# Patient Record
Sex: FEMALE | Race: WHITE | Employment: OTHER | URBAN - NONMETROPOLITAN AREA
[De-identification: names, ages, dates, MRNs, and addresses within clinical notes are randomized per-mention and may not be internally consistent; named-entity substitution may affect disease eponyms.]

---

## 2017-07-17 PROBLEM — I77.811 AORTIC ECTASIA, ABDOMINAL (HCC): Status: ACTIVE | Noted: 2017-07-17

## 2017-07-17 PROBLEM — I65.29 CAROTID STENOSIS, ASYMPTOMATIC: Status: ACTIVE | Noted: 2017-07-17

## 2017-12-23 ENCOUNTER — HOSPITAL ENCOUNTER (EMERGENCY)
Dept: GENERAL RADIOLOGY | Age: 82
Discharge: HOME | End: 2017-12-23
Payer: MEDICARE

## 2017-12-23 ENCOUNTER — HOSPITAL ENCOUNTER (EMERGENCY)
Age: 82
Discharge: HOME | End: 2017-12-23
Payer: MEDICARE

## 2017-12-23 VITALS
BODY MASS INDEX: 22.09 KG/M2 | WEIGHT: 117 LBS | HEIGHT: 61 IN | TEMPERATURE: 98.2 F | RESPIRATION RATE: 16 BRPM | SYSTOLIC BLOOD PRESSURE: 174 MMHG | HEART RATE: 70 BPM | OXYGEN SATURATION: 98 % | DIASTOLIC BLOOD PRESSURE: 84 MMHG

## 2017-12-23 DIAGNOSIS — N30.01 ACUTE CYSTITIS WITH HEMATURIA: ICD-10-CM

## 2017-12-23 DIAGNOSIS — S32.000A LUMBAR COMPRESSION FRACTURE, CLOSED, INITIAL ENCOUNTER (HCC): Primary | ICD-10-CM

## 2017-12-23 LAB
BILIRUBIN URINE: NEGATIVE
BLOOD, URINE: NEGATIVE
CHARACTER, URINE: CLEAR
COLOR: ABNORMAL
GLUCOSE, URINE: NEGATIVE MG/DL
KETONES, URINE: ABNORMAL
LEUKOCYTES, UA: ABNORMAL
NITRATE, UA: NEGATIVE
PH UA: 6 (ref 5–9)
PROTEIN UA: >= 300 MG/DL
REFLEX TO URINE C & S: ABNORMAL
SPECIFIC GRAVITY UA: 1.02 (ref 1–1.03)
UROBILINOGEN, URINE: 0.2 EU/DL (ref 0–1)

## 2017-12-23 PROCEDURE — 87086 URINE CULTURE/COLONY COUNT: CPT

## 2017-12-23 PROCEDURE — 99215 OFFICE O/P EST HI 40 MIN: CPT

## 2017-12-23 PROCEDURE — 72100 X-RAY EXAM L-S SPINE 2/3 VWS: CPT

## 2017-12-23 PROCEDURE — 99203 OFFICE O/P NEW LOW 30 MIN: CPT | Performed by: NURSE PRACTITIONER

## 2017-12-23 PROCEDURE — 81003 URINALYSIS AUTO W/O SCOPE: CPT

## 2017-12-23 RX ORDER — ACETAMINOPHEN AND CODEINE PHOSPHATE 300; 30 MG/1; MG/1
1 TABLET ORAL EVERY 6 HOURS PRN
Qty: 16 TABLET | Refills: 0 | Status: SHIPPED | OUTPATIENT
Start: 2017-12-23 | End: 2017-12-27

## 2017-12-23 RX ORDER — SULFAMETHOXAZOLE AND TRIMETHOPRIM 400; 80 MG/1; MG/1
1 TABLET ORAL 2 TIMES DAILY
Qty: 20 TABLET | Refills: 0 | Status: SHIPPED | OUTPATIENT
Start: 2017-12-23 | End: 2018-01-02

## 2017-12-23 ASSESSMENT — PAIN SCALES - GENERAL: PAINLEVEL_OUTOF10: 5

## 2017-12-23 ASSESSMENT — PAIN DESCRIPTION - PAIN TYPE: TYPE: ACUTE PAIN

## 2017-12-23 ASSESSMENT — PAIN DESCRIPTION - ORIENTATION: ORIENTATION: RIGHT;LOWER

## 2017-12-23 ASSESSMENT — ENCOUNTER SYMPTOMS: BACK PAIN: 1

## 2017-12-23 ASSESSMENT — PAIN DESCRIPTION - LOCATION: LOCATION: BACK

## 2017-12-23 NOTE — ED PROVIDER NOTES
[facility]  Urgent Care Encounter      CHIEF COMPLAINT       Chief Complaint   Patient presents with    Flank Pain     right       Nurses Notes reviewed and I agree except as noted in the HPI. HISTORY OF PRESENT ILLNESS   Rena Madera is a 80 y.o. female who presents Leaned over in chair sudden onset right flank pain. Denies any UTI symptoms. Is kidney patient Dr. Rosy Springer. No fever, SOB. The history is provided by the patient. No  was used. Back Pain   Pain location: right flank. Quality:  Stabbing  Radiates to:  Does not radiate  Pain severity:  Severe  Pain is:  Same all the time  Onset quality:  Sudden  Duration:  23 hours  Timing:  Intermittent  Progression:  Worsening  Chronicity:  New  Context: twisting    Context comment:  Reaching   Associated symptoms: no dysuria and no headaches    Risk factors: hx of osteoporosis    Risk factors comment:  Chronic kidney disease        REVIEW OF SYSTEMS     Review of Systems   Constitutional: Positive for activity change. Negative for chills and fatigue. Genitourinary: Negative for dysuria and urgency. Musculoskeletal: Positive for back pain. Right flank and lower back   Skin: Negative for rash. Neurological: Positive for dizziness. Negative for headaches. Am dizziness       PAST MEDICAL HISTORY         Diagnosis Date    Arthritis     CAD (coronary artery disease)     Cancer ([facility] [address]     uterine    Diabetes mellitus ([facility] [address]     GERD (gastroesophageal reflux disease)     Hematuria     Hyperlipidemia     Hypertension     Kidney atrophy     left kidney    Other disorders of kidney and ureter     Thyroid disease     UTI (urinary tract infection)        SURGICAL HISTORY     Patient  has a past surgical history that includes Stomach surgery; Coronary artery bypass graft; Coronary artery bypass graft; Knee arthroscopy (Left, 1983); Bunionectomy (Bilateral, 1993); Colonoscopy; back surgery (2008); (36.8 °C)   SpO2: 98%   Weight: 117 lb (53.1 kg)   Height: 5' 1\" (1.549 m)       Medications - No data to display  PROCEDURES:  None  FINAL IMPRESSION      1. Lumbar compression fracture, closed, initial encounter ([facility] [address]    2. Acute cystitis with hematuria        DISPOSITION/PLAN   DISPOSITION    Educated patient on diagnosis of compression fracture lumbar spine and treatment. Patient recalls that she had had previous treatment for a compression fracture where they injected medicine in her back. She understands the procedure. She would like to go home for [place] on some pain medication. She understands that if she gets uncomfortable the pain gets worse she will go directly to the emergency department. She also will call her family doctor after [place] when the office reopens to make arrangements for kyphoplasty.  was present in the room and they're both agreeable to this plan of care. PATIENT REFERRED TO:  Fawn Garcia MD  [address] [facility] [address]  [phone]      call for an appointment to see your doctor    DISCHARGE MEDICATIONS:  New Prescriptions    ACETAMINOPHEN-CODEINE (TYLENOL #3) 300-30 MG PER TABLET    Take 1 tablet by mouth every 6 hours as needed for Pain .     SULFAMETHOXAZOLE-TRIMETHOPRIM (BACTRIM) 400-80 MG PER TABLET    Take 1 tablet by mouth 2 times daily for 10 days     Current Discharge Medication List          CARMEN Davila CNP  12/23/17 [address]19 [address], [facility]  12/23/17 2023

## 2017-12-25 LAB
ORGANISM: ABNORMAL
URINE CULTURE REFLEX: ABNORMAL

## 2017-12-26 ENCOUNTER — APPOINTMENT (OUTPATIENT)
Dept: GENERAL RADIOLOGY | Age: 82
End: 2017-12-26
Payer: MEDICARE

## 2017-12-26 ENCOUNTER — APPOINTMENT (OUTPATIENT)
Dept: CT IMAGING | Age: 82
End: 2017-12-26
Payer: MEDICARE

## 2017-12-26 ENCOUNTER — HOSPITAL ENCOUNTER (EMERGENCY)
Age: 82
Discharge: HOME | End: 2017-12-26
Attending: FAMILY MEDICINE
Payer: MEDICARE

## 2017-12-26 VITALS
SYSTOLIC BLOOD PRESSURE: 176 MMHG | DIASTOLIC BLOOD PRESSURE: 64 MMHG | OXYGEN SATURATION: 95 % | HEART RATE: 63 BPM | TEMPERATURE: 97.6 F | RESPIRATION RATE: 18 BRPM

## 2017-12-26 DIAGNOSIS — M54.50 RIGHT-SIDED LOW BACK PAIN WITHOUT SCIATICA, UNSPECIFIED CHRONICITY: Primary | ICD-10-CM

## 2017-12-26 LAB
ANION GAP SERPL CALCULATED.3IONS-SCNC: 12 MEQ/L (ref 8–16)
ANISOCYTOSIS: ABNORMAL
BASOPHILS # BLD: 0.8 %
BASOPHILS ABSOLUTE: 0.1 THOU/MM3 (ref 0–0.1)
BUN BLDV-MCNC: 34 MG/DL (ref 7–22)
CALCIUM SERPL-MCNC: 8.8 MG/DL (ref 8.5–10.5)
CHLORIDE BLD-SCNC: 100 MEQ/L (ref 98–111)
CO2: 27 MEQ/L (ref 23–33)
CREAT SERPL-MCNC: 1.8 MG/DL (ref 0.4–1.2)
EOSINOPHIL # BLD: 4.9 %
EOSINOPHILS ABSOLUTE: 0.5 THOU/MM3 (ref 0–0.4)
GFR SERPL CREATININE-BSD FRML MDRD: 27 ML/MIN/1.73M2
GLUCOSE BLD-MCNC: 121 MG/DL (ref 70–108)
HCT VFR BLD CALC: 35.9 % (ref 37–47)
HEMOGLOBIN: 12 GM/DL (ref 12–16)
LYMPHOCYTES # BLD: 14.9 %
LYMPHOCYTES ABSOLUTE: 1.4 THOU/MM3 (ref 1–4.8)
MCH RBC QN AUTO: 31.1 PG (ref 27–31)
MCHC RBC AUTO-ENTMCNC: 33.3 GM/DL (ref 33–37)
MCV RBC AUTO: 93.3 FL (ref 81–99)
MONOCYTES # BLD: 7.2 %
MONOCYTES ABSOLUTE: 0.7 THOU/MM3 (ref 0.4–1.3)
NUCLEATED RED BLOOD CELLS: 0 /100 WBC
OSMOLALITY CALCULATION: 286.4 MOSMOL/KG (ref 275–300)
PDW BLD-RTO: 15.5 % (ref 11.5–14.5)
PLATELET # BLD: 198 THOU/MM3 (ref 130–400)
PMV BLD AUTO: 9.4 MCM (ref 7.4–10.4)
POTASSIUM SERPL-SCNC: 4.3 MEQ/L (ref 3.5–5.2)
RBC # BLD: 3.85 MILL/MM3 (ref 4.2–5.4)
SEG NEUTROPHILS: 72.2 %
SEGMENTED NEUTROPHILS ABSOLUTE COUNT: 6.7 THOU/MM3 (ref 1.8–7.7)
SODIUM BLD-SCNC: 139 MEQ/L (ref 135–145)
WBC # BLD: 9.3 THOU/MM3 (ref 4.8–10.8)

## 2017-12-26 PROCEDURE — 99284 EMERGENCY DEPT VISIT MOD MDM: CPT

## 2017-12-26 PROCEDURE — 71020 XR CHEST STANDARD TWO VW: CPT

## 2017-12-26 PROCEDURE — 36415 COLL VENOUS BLD VENIPUNCTURE: CPT

## 2017-12-26 PROCEDURE — 80048 BASIC METABOLIC PNL TOTAL CA: CPT

## 2017-12-26 PROCEDURE — 85025 COMPLETE CBC W/AUTO DIFF WBC: CPT

## 2017-12-26 PROCEDURE — 76376 3D RENDER W/INTRP POSTPROCES: CPT

## 2017-12-26 PROCEDURE — 74176 CT ABD & PELVIS W/O CONTRAST: CPT

## 2017-12-26 PROCEDURE — 6370000000 HC RX 637 (ALT 250 FOR IP): Performed by: FAMILY MEDICINE

## 2017-12-26 RX ADMIN — Medication 30 ML: at 14:12

## 2017-12-26 ASSESSMENT — ENCOUNTER SYMPTOMS
ABDOMINAL PAIN: 0
NAUSEA: 0
SHORTNESS OF BREATH: 0
VOMITING: 0
BACK PAIN: 1

## 2017-12-26 NOTE — ED NOTES
Pt up to BR without difficulty. Updated on POC. Will monitor.       Wojciech Jimenez RN  12/26/17 2261

## 2017-12-26 NOTE — ED PROVIDER NOTES
[facility]  eMERGENCY dEPARTMENT eNCOUnter          Haylie Raymundo       Chief Complaint   Patient presents with    Flank Pain     right       Nurses Notes reviewed and I agree except as noted in the HPI. HISTORY OF PRESENT ILLNESS    Jan King is a 80 y.o. female who presents to the Emergency Department for the evaluation of right flank pain. The patient states her pain started 4 days ago when she got up from the kitchen table. She states the pain progressively got worse throughout the day. Patient went to Urgent Care on 12/23/17 and was diagnosed with a bladder infection and a lumbar varinder fracture. She has been taking the antibiotics for the bactrim and she denies any urinary symptoms. Patient took her tylenol with codeine 3 days ago and states it helped, but the second pill made her loopy. Patient had some pain yesterday, so she took 1/2 of a pill, which did not make her loopy and it did help. She states that her pain is not too bad today. Patient states that her pain is intermittent and is made worse with certain movement and standing. She states her pain is better when she is laying down at night. The patient has been using her cane, and she states she uses it constantly. She reports associated nausea. She denies abdominal pain, vomiting, fever, chills, diarrhea or shortness of breath. The patient states she only hs her right kidney and her left kidney didn't develop. Location/Symptom: right flank pain  Timing/Onset: 4 days ago  Context/Setting: diagnosed with a bladder infection and lumbar spine fracture on 12/23/17  Quality: sharp at times   Duration: intermittent  Modifying Factors: worse with certain movement and standing, better when she is laying down at night  Severity: 5/10     The HPI was provided by the patient. REVIEW OF SYSTEMS     Review of Systems   Constitutional: Negative for chills and fever. HENT: Negative for congestion.     Respiratory: Negative for shortness of breath. Cardiovascular: Negative for chest pain. Gastrointestinal: Negative for abdominal pain, nausea and vomiting. Genitourinary: Negative for dysuria and urgency. Started on antibiotic for UTI though had no symptoms   Musculoskeletal: Positive for back pain. Right low back pain    No pain over the midline   Skin: Negative for rash. Neurological: Negative for weakness and numbness. Hematological: Negative for adenopathy. Psychiatric/Behavioral: Negative for confusion. PAST MEDICAL HISTORY    has a past medical history of Arthritis; CAD (coronary artery disease); Cancer ([facility] [address] Diabetes mellitus ([facility] [address] GERD (gastroesophageal reflux disease); Hematuria; Hyperlipidemia; Hypertension; Kidney atrophy; Other disorders of kidney and ureter; Thyroid disease; and UTI (urinary tract infection). SURGICAL HISTORY      has a past surgical history that includes Stomach surgery; Coronary artery bypass graft; Coronary artery bypass graft; Knee arthroscopy (Left, 1983); Bunionectomy (Bilateral, 1993); Colonoscopy; back surgery (2008); Breast biopsy (Left, 1975); Knee arthroscopy (Right, 1991); Nose surgery (1996); Knee arthroscopy (Right, 2009); Coronary artery bypass graft (2006); and other surgical history (5/23/2013). CURRENT MEDICATIONS       Previous Medications    ACETAMINOPHEN-CODEINE (TYLENOL #3) 300-30 MG PER TABLET    Take 1 tablet by mouth every 6 hours as needed for Pain . ALLOPURINOL (ZYLOPRIM) 100 MG TABLET    Take 100 mg by mouth daily.       AMLODIPINE-BENAZEPRIL (LOTREL) 5-20 MG PER CAPSULE    Take 1 capsule by mouth daily     ATORVASTATIN (LIPITOR) 20 MG TABLET    Take 20 mg by mouth daily    CALCIUM CARBONATE (CALCIUM 600 PO)    Take 2 tablets by mouth daily    CHOLECALCIFEROL (VITAMIN D3) 2000 UNITS CAPS    Take 1 capsule by mouth daily    DENOSUMAB (PROLIA) 60 MG/ML SOLN SC INJECTION    Inject 60 mg into the skin once ONCE PER YEAR    DOCUSATE CALCIUM (STOOL Musculoskeletal: She exhibits tenderness. Tender at the right CVA    No tenderness directly along the spine    Arms and legs move well with no pain   Lymphadenopathy:     She has no cervical adenopathy. Neurological: She is alert and oriented to person, place, and time. She exhibits normal muscle tone. Skin: Skin is warm and dry. No rash noted. Psychiatric: She has a normal mood and affect. Her behavior is normal.   Nursing note and vitals reviewed. DIFFERENTIAL DIAGNOSIS:   Right CVA pain, this does not appear to be due to a compression fracture of her spine    A CAT scan to look for intra-abdominal process such as stone will also get CT reconstructions of the lumbar spine        DIAGNOSTIC RESULTS       RADIOLOGY: non-plain film images(s) such as CT, Ultrasound and MRI are read by the radiologist.    CT scan of the abdomen and pelvis did show gallstones. No other acute intra-abdominal process. CT reconstructions lumbar spine did show compression fractures of L1 and 2 which were chronic.     compression fractures of L3 and 4 which are new since 2014.   No definite evidence of acute process though age-indeterminate per radiology reading        LABS:   Labs Reviewed   CBC WITH AUTO DIFFERENTIAL - Abnormal; Notable for the following:        Result Value    RBC 3.85 (*)     Hematocrit 35.9 (*)     MCH 31.1 (*)     RDW 15.5 (*)     Eosinophils # 0.5 (*)     All other components within normal limits   BASIC METABOLIC PANEL - Abnormal; Notable for the following:     Glucose 121 (*)     BUN 34 (*)     CREATININE 1.8 (*)     All other components within normal limits   GLOMERULAR FILTRATION RATE, ESTIMATED - Abnormal; Notable for the following:     Est, Glom Filt Rate 27 (*)     All other components within normal limits   ANION GAP   OSMOLALITY       EMERGENCY DEPARTMENT COURSE:   Vitals:    Vitals:    12/26/17 1159 12/26/17 1311   BP: (!) 179/63 (!) 176/64   Pulse: 64 63   Resp: 18 18   Temp: 97.6 °F (36.4 °C)    TempSrc: Oral    SpO2: 98% 95%       11:52 AM: The patient was seen and evaluated. Nursing notes reviewed     CT scan does confirm compression fractures of L1 and 2 which were old    Fractures of L3 and 4 which are new since 2014 but age-indeterminate    Her pain is not midline so I suspect they are not acute    Her urine culture from urgent care grew negative    She can stop her Bactrim    We'll treat her symptomatically for pain    Discharge home        CRITICAL CARE:   none     CONSULTS:   none     PROCEDURES:  None     FINAL IMPRESSION      1. Right-sided low back pain without sciatica, unspecified chronicity          DISPOSITION/PLAN   Discharge home     PATIENT REFERRED TO:  Zacarias Del Valle MD  [address] [facility] [address]  [phone]    In 1 week        DISCHARGE MEDICATIONS:  New Prescriptions    No medications on file       (Please note that portions of this note were completed with a voice recognition program.  Efforts were made to edit the dictations but occasionally words are mis-transcribed.)    Scribe:  Lopez Haley 12/26/17 11:52 AM Scribing for and in the presence of Enma Padilla MD.    Signed by: Gaurang Marte, 12/26/17 3:15 PM    Provider:  I personally performed the services described in the documentation, reviewed and edited the documentation which was dictated to the scribe in my presence, and it accurately records my words and actions.     Enma Padilla MD 12/26/17 3:15 PM        Enma Padilla MD  12/26/17 7298

## 2018-01-01 ENCOUNTER — OFFICE VISIT (OUTPATIENT)
Dept: FAMILY MEDICINE CLINIC | Age: 83
End: 2018-01-01
Payer: MEDICARE

## 2018-01-01 VITALS
RESPIRATION RATE: 12 BRPM | BODY MASS INDEX: 21.1 KG/M2 | HEIGHT: 60 IN | SYSTOLIC BLOOD PRESSURE: 118 MMHG | HEART RATE: 76 BPM | WEIGHT: 107.5 LBS | DIASTOLIC BLOOD PRESSURE: 72 MMHG

## 2018-01-01 DIAGNOSIS — R42 ORTHOSTATIC LIGHTHEADEDNESS: ICD-10-CM

## 2018-01-01 DIAGNOSIS — M54.16 LUMBAR RADICULAR PAIN: Primary | ICD-10-CM

## 2018-01-01 DIAGNOSIS — R26.81 UNSTABLE GAIT: ICD-10-CM

## 2018-01-01 DIAGNOSIS — M79.10 MYALGIA, UNSPECIFIED SITE: ICD-10-CM

## 2018-01-01 DIAGNOSIS — I10 ESSENTIAL HYPERTENSION: ICD-10-CM

## 2018-01-01 DIAGNOSIS — E03.9 HYPOTHYROIDISM, UNSPECIFIED TYPE: ICD-10-CM

## 2018-01-01 DIAGNOSIS — R41.3 SHORT-TERM MEMORY LOSS: ICD-10-CM

## 2018-01-01 DIAGNOSIS — N18.9 CHRONIC KIDNEY DISEASE, UNSPECIFIED CKD STAGE: ICD-10-CM

## 2018-01-01 LAB — TSH SERPL DL<=0.05 MIU/L-ACNC: 0.08 UIU/ML

## 2018-01-01 PROCEDURE — 4040F PNEUMOC VAC/ADMIN/RCVD: CPT | Performed by: FAMILY MEDICINE

## 2018-01-01 PROCEDURE — 99214 OFFICE O/P EST MOD 30 MIN: CPT | Performed by: FAMILY MEDICINE

## 2018-01-01 PROCEDURE — G8427 DOCREV CUR MEDS BY ELIG CLIN: HCPCS | Performed by: FAMILY MEDICINE

## 2018-01-01 PROCEDURE — G8420 CALC BMI NORM PARAMETERS: HCPCS | Performed by: FAMILY MEDICINE

## 2018-01-01 PROCEDURE — 1101F PT FALLS ASSESS-DOCD LE1/YR: CPT | Performed by: FAMILY MEDICINE

## 2018-01-01 PROCEDURE — 1123F ACP DISCUSS/DSCN MKR DOCD: CPT | Performed by: FAMILY MEDICINE

## 2018-01-01 PROCEDURE — G8484 FLU IMMUNIZE NO ADMIN: HCPCS | Performed by: FAMILY MEDICINE

## 2018-01-01 PROCEDURE — G8599 NO ASA/ANTIPLAT THER USE RNG: HCPCS | Performed by: FAMILY MEDICINE

## 2018-01-01 PROCEDURE — 1090F PRES/ABSN URINE INCON ASSESS: CPT | Performed by: FAMILY MEDICINE

## 2018-01-01 PROCEDURE — 1036F TOBACCO NON-USER: CPT | Performed by: FAMILY MEDICINE

## 2018-01-01 RX ORDER — BENAZEPRIL HYDROCHLORIDE 20 MG/1
20 TABLET ORAL DAILY
Qty: 90 TABLET | Refills: 11 | Status: SHIPPED | OUTPATIENT
Start: 2018-01-01

## 2018-01-01 RX ORDER — LEVOTHYROXINE SODIUM 0.07 MG/1
75 TABLET ORAL DAILY
Qty: 90 TABLET | Refills: 11 | Status: SHIPPED | OUTPATIENT
Start: 2018-01-01 | End: 2019-01-01

## 2018-01-01 RX ORDER — LEVOTHYROXINE SODIUM 0.07 MG/1
75 TABLET ORAL DAILY
Qty: 30 TABLET | Refills: 11 | Status: SHIPPED | OUTPATIENT
Start: 2018-01-01 | End: 2018-01-01 | Stop reason: SDUPTHER

## 2018-01-01 RX ORDER — BENAZEPRIL HYDROCHLORIDE 20 MG/1
20 TABLET ORAL DAILY
Qty: 30 TABLET | Refills: 11 | Status: SHIPPED | OUTPATIENT
Start: 2018-01-01 | End: 2018-01-01 | Stop reason: SDUPTHER

## 2018-01-01 ASSESSMENT — ENCOUNTER SYMPTOMS
BACK PAIN: 1
RESPIRATORY NEGATIVE: 1
GASTROINTESTINAL NEGATIVE: 1

## 2018-02-07 ENCOUNTER — TELEPHONE (OUTPATIENT)
Dept: FAMILY MEDICINE CLINIC | Age: 83
End: 2018-02-07

## 2018-02-07 NOTE — TELEPHONE ENCOUNTER
2/7/18 Pt is asking if she can become established w/ Dr Brady Morris. Her  is an established pt Anh Darling).  Pls advise thanks/agustina

## 2018-02-20 ENCOUNTER — OFFICE VISIT (OUTPATIENT)
Dept: FAMILY MEDICINE CLINIC | Age: 83
End: 2018-02-20
Payer: MEDICARE

## 2018-02-20 VITALS
OXYGEN SATURATION: 91 % | HEART RATE: 60 BPM | HEIGHT: 60 IN | BODY MASS INDEX: 22.03 KG/M2 | RESPIRATION RATE: 16 BRPM | DIASTOLIC BLOOD PRESSURE: 74 MMHG | WEIGHT: 112.2 LBS | SYSTOLIC BLOOD PRESSURE: 136 MMHG

## 2018-02-20 DIAGNOSIS — K21.9 GASTROESOPHAGEAL REFLUX DISEASE, ESOPHAGITIS PRESENCE NOT SPECIFIED: ICD-10-CM

## 2018-02-20 DIAGNOSIS — E03.9 HYPOTHYROIDISM, UNSPECIFIED TYPE: ICD-10-CM

## 2018-02-20 DIAGNOSIS — M10.00 IDIOPATHIC GOUT, UNSPECIFIED CHRONICITY, UNSPECIFIED SITE: ICD-10-CM

## 2018-02-20 DIAGNOSIS — I77.811 AORTIC ECTASIA, ABDOMINAL (HCC): ICD-10-CM

## 2018-02-20 DIAGNOSIS — E11.9 DIET-CONTROLLED TYPE 2 DIABETES MELLITUS (HCC): ICD-10-CM

## 2018-02-20 DIAGNOSIS — I65.29 ASYMPTOMATIC CAROTID ARTERY STENOSIS, UNSPECIFIED LATERALITY: Primary | ICD-10-CM

## 2018-02-20 DIAGNOSIS — I10 ESSENTIAL HYPERTENSION: ICD-10-CM

## 2018-02-20 DIAGNOSIS — M81.8 OTHER OSTEOPOROSIS, UNSPECIFIED PATHOLOGICAL FRACTURE PRESENCE: ICD-10-CM

## 2018-02-20 DIAGNOSIS — N18.9 CHRONIC KIDNEY DISEASE, UNSPECIFIED CKD STAGE: ICD-10-CM

## 2018-02-20 DIAGNOSIS — E78.49 OTHER HYPERLIPIDEMIA: ICD-10-CM

## 2018-02-20 PROBLEM — M81.0 OSTEOPOROSIS: Status: ACTIVE | Noted: 2018-02-20

## 2018-02-20 PROCEDURE — G8427 DOCREV CUR MEDS BY ELIG CLIN: HCPCS | Performed by: FAMILY MEDICINE

## 2018-02-20 PROCEDURE — G8599 NO ASA/ANTIPLAT THER USE RNG: HCPCS | Performed by: FAMILY MEDICINE

## 2018-02-20 PROCEDURE — 1036F TOBACCO NON-USER: CPT | Performed by: FAMILY MEDICINE

## 2018-02-20 PROCEDURE — 1090F PRES/ABSN URINE INCON ASSESS: CPT | Performed by: FAMILY MEDICINE

## 2018-02-20 PROCEDURE — 99204 OFFICE O/P NEW MOD 45 MIN: CPT | Performed by: FAMILY MEDICINE

## 2018-02-20 PROCEDURE — G8420 CALC BMI NORM PARAMETERS: HCPCS | Performed by: FAMILY MEDICINE

## 2018-02-20 PROCEDURE — 4040F PNEUMOC VAC/ADMIN/RCVD: CPT | Performed by: FAMILY MEDICINE

## 2018-02-20 PROCEDURE — 1123F ACP DISCUSS/DSCN MKR DOCD: CPT | Performed by: FAMILY MEDICINE

## 2018-02-20 PROCEDURE — G8484 FLU IMMUNIZE NO ADMIN: HCPCS | Performed by: FAMILY MEDICINE

## 2018-02-20 RX ORDER — AMLODIPINE BESYLATE AND BENAZEPRIL HYDROCHLORIDE 5; 20 MG/1; MG/1
1 CAPSULE ORAL DAILY
Qty: 30 CAPSULE | Refills: 11 | Status: SHIPPED | OUTPATIENT
Start: 2018-02-20 | End: 2018-04-14 | Stop reason: SDUPTHER

## 2018-02-20 ASSESSMENT — ENCOUNTER SYMPTOMS
RESPIRATORY NEGATIVE: 1
GASTROINTESTINAL NEGATIVE: 1

## 2018-02-20 ASSESSMENT — PATIENT HEALTH QUESTIONNAIRE - PHQ9
1. LITTLE INTEREST OR PLEASURE IN DOING THINGS: 0
2. FEELING DOWN, DEPRESSED OR HOPELESS: 0
SUM OF ALL RESPONSES TO PHQ9 QUESTIONS 1 & 2: 0
SUM OF ALL RESPONSES TO PHQ QUESTIONS 1-9: 0

## 2018-02-20 NOTE — PROGRESS NOTES
Patient informed and scheduled at [facility] on 2/22/18 arrival at 1:15 pm for 1:30 pm dexa scan. Prep of no calcium or multi vitamins 24 hours prior to testing, patient voiced understanding.
(CALCIUM 600 PO) Take 2 tablets by mouth daily   Yes Historical Provider, MD   GLUCOSAMINE-CHONDROITIN PO Take 1 tablet by mouth 2 times daily   Yes Historical Provider, MD   Ferrous Sulfate (IRON) 325 (65 Fe) MG TABS Take 2 tablets by mouth daily   Yes Historical Provider, MD   Cholecalciferol (VITAMIN D3) 2000 units CAPS Take 1 capsule by mouth daily   Yes Historical Provider, MD   Docusate Calcium (STOOL SOFTENER PO) Take 2 tablets by mouth nightly   Yes Historical Provider, MD   denosumab (PROLIA) 60 MG/ML SOLN SC injection Inject 60 mg into the skin once ONCE PER YEAR   Yes Historical Provider, MD   amLODIPine-benazepril (LOTREL) 5-20 MG per capsule Take 1 capsule by mouth daily    Yes Historical Provider, MD   levothyroxine (SYNTHROID) 125 MCG tablet Take 125 mcg by mouth daily. Yes Historical Provider, MD   Multiple Vitamin (DAILY VITAMIN FORMULA PO) Take 1 tablet by mouth daily. Yes Historical Provider, MD   allopurinol (ZYLOPRIM) 100 MG tablet Take 100 mg by mouth daily. Yes Historical Provider, MD   metoprolol (TOPROL-XL) 50 MG XL tablet Take 50 mg by mouth 2 times daily.      Yes Historical Provider, MD       Lab Results   Component Value Date    LABA1C 6.3 12/22/2014     No results found for: EAG    No components found for: CHLPL  No results found for: TRIG  No results found for: HDL  No results found for: LDLCALC  No results found for: LABVLDL      Chemistry        Component Value Date/Time     12/26/2017 1221    K 4.3 12/26/2017 1221     12/26/2017 1221    CO2 27 12/26/2017 1221    BUN 34 (H) 12/26/2017 1221    CREATININE 1.8 (H) 12/26/2017 1221        Component Value Date/Time    CALCIUM 8.8 12/26/2017 1221    ALKPHOS 69 11/27/2012 0917    AST 20 11/27/2012 0917    ALT 11 11/27/2012 0917    BILITOT 0.4 11/27/2012 0917            Lab Results   Component Value Date    TSH 4.640 (H) 12/22/2014       Lab Results   Component Value Date    WBC 9.3 12/26/2017    HGB 12.0 12/26/2017    HCT

## 2018-02-22 ENCOUNTER — HOSPITAL ENCOUNTER (OUTPATIENT)
Dept: WOMENS IMAGING | Age: 83
Discharge: HOME | End: 2018-02-22
Payer: MEDICARE

## 2018-02-22 DIAGNOSIS — M81.8 OTHER OSTEOPOROSIS, UNSPECIFIED PATHOLOGICAL FRACTURE PRESENCE: ICD-10-CM

## 2018-02-22 PROCEDURE — 77080 DXA BONE DENSITY AXIAL: CPT

## 2018-03-02 LAB
AVERAGE GLUCOSE: NORMAL
CREATININE URINE: 95.2 MG/DL
HBA1C MFR BLD: 5.7 %
MICROALBUMIN/CREAT 24H UR: 1102.6 MG/G{CREAT}
TSH SERPL DL<=0.05 MIU/L-ACNC: 24.3 UIU/ML

## 2018-03-07 ENCOUNTER — OFFICE VISIT (OUTPATIENT)
Dept: FAMILY MEDICINE CLINIC | Age: 83
End: 2018-03-07
Payer: MEDICARE

## 2018-03-07 VITALS
RESPIRATION RATE: 12 BRPM | DIASTOLIC BLOOD PRESSURE: 76 MMHG | HEIGHT: 60 IN | SYSTOLIC BLOOD PRESSURE: 130 MMHG | HEART RATE: 68 BPM | WEIGHT: 110 LBS | BODY MASS INDEX: 21.6 KG/M2

## 2018-03-07 DIAGNOSIS — E03.9 HYPOTHYROIDISM, UNSPECIFIED TYPE: ICD-10-CM

## 2018-03-07 DIAGNOSIS — E03.9 HYPOTHYROIDISM, UNSPECIFIED TYPE: Primary | ICD-10-CM

## 2018-03-07 PROCEDURE — 4040F PNEUMOC VAC/ADMIN/RCVD: CPT | Performed by: FAMILY MEDICINE

## 2018-03-07 PROCEDURE — 1090F PRES/ABSN URINE INCON ASSESS: CPT | Performed by: FAMILY MEDICINE

## 2018-03-07 PROCEDURE — G8599 NO ASA/ANTIPLAT THER USE RNG: HCPCS | Performed by: FAMILY MEDICINE

## 2018-03-07 PROCEDURE — G8427 DOCREV CUR MEDS BY ELIG CLIN: HCPCS | Performed by: FAMILY MEDICINE

## 2018-03-07 PROCEDURE — 99213 OFFICE O/P EST LOW 20 MIN: CPT | Performed by: FAMILY MEDICINE

## 2018-03-07 PROCEDURE — G8420 CALC BMI NORM PARAMETERS: HCPCS | Performed by: FAMILY MEDICINE

## 2018-03-07 PROCEDURE — G8484 FLU IMMUNIZE NO ADMIN: HCPCS | Performed by: FAMILY MEDICINE

## 2018-03-07 PROCEDURE — 1123F ACP DISCUSS/DSCN MKR DOCD: CPT | Performed by: FAMILY MEDICINE

## 2018-03-07 PROCEDURE — 1036F TOBACCO NON-USER: CPT | Performed by: FAMILY MEDICINE

## 2018-03-07 RX ORDER — LEVOTHYROXINE SODIUM 0.12 MG/1
125 TABLET ORAL DAILY
Qty: 30 TABLET | Refills: 2 | Status: SHIPPED | OUTPATIENT
Start: 2018-03-07 | End: 2018-03-07 | Stop reason: SDUPTHER

## 2018-03-07 RX ORDER — LEVOTHYROXINE SODIUM 0.12 MG/1
125 TABLET ORAL DAILY
Qty: 90 TABLET | Refills: 0 | Status: SHIPPED | OUTPATIENT
Start: 2018-03-07 | End: 2018-06-19 | Stop reason: DRUGHIGH

## 2018-03-07 ASSESSMENT — ENCOUNTER SYMPTOMS
GASTROINTESTINAL NEGATIVE: 1
RESPIRATORY NEGATIVE: 1

## 2018-03-07 NOTE — PROGRESS NOTES
Visit Information    Have you changed or started any medications since your last visit including any over-the-counter medicines, vitamins, or herbal medicines? no   Are you having any side effects from any of your medications? -  no  Have you stopped taking any of your medications? Is so, why? -  no    Have you seen any other physician or provider since your last visit? No  Have you had any other diagnostic tests since your last visit? Yes - Records Obtained  Have you been seen in the emergency room and/or had an admission to a hospital since we last saw you? No  Have you had your routine dental cleaning in the past 6 months? no    Have you activated your [employer] account? If not, what are your barriers?  No: declines     Patient Care Team:  Nivia Lema DO as PCP - General (Family Medicine)  Rola Ogden MD as Physician (Cardiology)  Edu Beebe MD as Physician (Nephrology)    Medical History Review  Past Medical, Family, and Social History reviewed and does contribute to the patient presenting condition    Health Maintenance   Topic Date Due    DTaP/Tdap/Td vaccine (1 - Tdap) 06/27/1948    Shingles Vaccine (1 of 2 - 2 Dose Series) 06/27/1979    Pneumococcal low/med risk (1 of 2 - PCV13) 06/27/1994    Flu vaccine (1) 09/01/2017    Potassium monitoring  12/26/2018    Creatinine monitoring  12/26/2018
GLUCOSAMINE-CHONDROITIN PO Take 1 tablet by mouth 2 times daily   Yes Historical Provider, MD   Ferrous Sulfate (IRON) 325 (65 Fe) MG TABS Take 2 tablets by mouth daily   Yes Historical Provider, MD   Cholecalciferol (VITAMIN D3) 2000 units CAPS Take 1 capsule by mouth daily   Yes Historical Provider, MD   Docusate Calcium (STOOL SOFTENER PO) Take 2 tablets by mouth nightly   Yes Historical Provider, MD   denosumab (PROLIA) 60 MG/ML SOLN SC injection Inject 60 mg into the skin once ONCE PER YEAR   Yes Historical Provider, MD   Multiple Vitamin (DAILY VITAMIN FORMULA PO) Take 1 tablet by mouth daily. Yes Historical Provider, MD   allopurinol (ZYLOPRIM) 100 MG tablet Take 100 mg by mouth daily. Yes Historical Provider, MD   metoprolol (TOPROL-XL) 50 MG XL tablet Take 50 mg by mouth 2 times daily. Yes Historical Provider, MD   levothyroxine (SYNTHROID) 125 MCG tablet TAKE 1 TABLET BY MOUTH DAILY 3/7/18   Rafael Ray DO         Review of Systems   Constitutional: Negative. HENT: Negative. Respiratory: Negative. Cardiovascular: Negative. Gastrointestinal: Negative. Musculoskeletal: Negative. All other systems reviewed and are negative. Objective:   Physical Exam   Constitutional: She is oriented to person, place, and time. She appears well-developed and well-nourished. HENT:   Head: Normocephalic and atraumatic. Right Ear: Tympanic membrane normal.   Left Ear: Tympanic membrane normal.   Mouth/Throat: Oropharynx is clear and moist and mucous membranes are normal.   Cardiovascular: Normal rate, regular rhythm and normal heart sounds. No murmur heard. Pulmonary/Chest: Effort normal and breath sounds normal.   Abdominal: Soft. Bowel sounds are normal.   Musculoskeletal: She exhibits no edema. Neurological: She is alert and oriented to person, place, and time. Skin: Skin is warm and dry. Psychiatric: She has a normal mood and affect.  Her behavior is normal.   Nursing

## 2018-03-29 ENCOUNTER — TELEPHONE (OUTPATIENT)
Dept: FAMILY MEDICINE CLINIC | Age: 83
End: 2018-03-29

## 2018-03-29 DIAGNOSIS — M81.8 OTHER OSTEOPOROSIS, UNSPECIFIED PATHOLOGICAL FRACTURE PRESENCE: Primary | ICD-10-CM

## 2018-03-30 ENCOUNTER — TELEPHONE (OUTPATIENT)
Dept: FAMILY MEDICINE CLINIC | Age: 83
End: 2018-03-30

## 2018-04-16 RX ORDER — AMLODIPINE BESYLATE AND BENAZEPRIL HYDROCHLORIDE 5; 20 MG/1; MG/1
1 CAPSULE ORAL DAILY
Qty: 90 CAPSULE | Refills: 11 | Status: SHIPPED | OUTPATIENT
Start: 2018-04-16 | End: 2018-01-01

## 2018-05-10 DIAGNOSIS — E03.9 HYPOTHYROIDISM, UNSPECIFIED TYPE: ICD-10-CM

## 2018-05-10 RX ORDER — LEVOTHYROXINE SODIUM 0.12 MG/1
125 TABLET ORAL DAILY
Qty: 90 TABLET | Refills: 2 | Status: SHIPPED | OUTPATIENT
Start: 2018-05-10 | End: 2018-06-06

## 2018-05-31 LAB — TSH SERPL DL<=0.05 MIU/L-ACNC: 0.05 UIU/ML

## 2018-06-04 DIAGNOSIS — E03.9 HYPOTHYROIDISM, UNSPECIFIED TYPE: Primary | ICD-10-CM

## 2018-06-06 RX ORDER — LEVOTHYROXINE SODIUM 112 UG/1
112 TABLET ORAL DAILY
Qty: 90 TABLET | Refills: 0 | Status: SHIPPED | OUTPATIENT
Start: 2018-06-06 | End: 2018-09-11

## 2018-06-19 ENCOUNTER — OFFICE VISIT (OUTPATIENT)
Dept: FAMILY MEDICINE CLINIC | Age: 83
End: 2018-06-19
Payer: MEDICARE

## 2018-06-19 ENCOUNTER — TELEPHONE (OUTPATIENT)
Dept: FAMILY MEDICINE CLINIC | Age: 83
End: 2018-06-19

## 2018-06-19 VITALS
WEIGHT: 106.6 LBS | BODY MASS INDEX: 20.93 KG/M2 | SYSTOLIC BLOOD PRESSURE: 102 MMHG | RESPIRATION RATE: 12 BRPM | HEIGHT: 60 IN | HEART RATE: 68 BPM | DIASTOLIC BLOOD PRESSURE: 64 MMHG

## 2018-06-19 DIAGNOSIS — E03.9 HYPOTHYROIDISM, UNSPECIFIED TYPE: Primary | ICD-10-CM

## 2018-06-19 DIAGNOSIS — K21.9 GASTROESOPHAGEAL REFLUX DISEASE, ESOPHAGITIS PRESENCE NOT SPECIFIED: ICD-10-CM

## 2018-06-19 DIAGNOSIS — N18.9 CHRONIC KIDNEY DISEASE, UNSPECIFIED CKD STAGE: ICD-10-CM

## 2018-06-19 DIAGNOSIS — M81.8 OTHER OSTEOPOROSIS WITHOUT CURRENT PATHOLOGICAL FRACTURE: ICD-10-CM

## 2018-06-19 DIAGNOSIS — E78.49 OTHER HYPERLIPIDEMIA: ICD-10-CM

## 2018-06-19 DIAGNOSIS — I77.811 AORTIC ECTASIA, ABDOMINAL (HCC): ICD-10-CM

## 2018-06-19 DIAGNOSIS — I65.29 ASYMPTOMATIC CAROTID ARTERY STENOSIS, UNSPECIFIED LATERALITY: ICD-10-CM

## 2018-06-19 PROCEDURE — 99214 OFFICE O/P EST MOD 30 MIN: CPT | Performed by: FAMILY MEDICINE

## 2018-06-19 PROCEDURE — 1036F TOBACCO NON-USER: CPT | Performed by: FAMILY MEDICINE

## 2018-06-19 PROCEDURE — 1123F ACP DISCUSS/DSCN MKR DOCD: CPT | Performed by: FAMILY MEDICINE

## 2018-06-19 PROCEDURE — 1090F PRES/ABSN URINE INCON ASSESS: CPT | Performed by: FAMILY MEDICINE

## 2018-06-19 PROCEDURE — G8427 DOCREV CUR MEDS BY ELIG CLIN: HCPCS | Performed by: FAMILY MEDICINE

## 2018-06-19 PROCEDURE — 4040F PNEUMOC VAC/ADMIN/RCVD: CPT | Performed by: FAMILY MEDICINE

## 2018-06-19 PROCEDURE — G8420 CALC BMI NORM PARAMETERS: HCPCS | Performed by: FAMILY MEDICINE

## 2018-06-19 PROCEDURE — G8599 NO ASA/ANTIPLAT THER USE RNG: HCPCS | Performed by: FAMILY MEDICINE

## 2018-06-19 RX ORDER — ALENDRONATE SODIUM 70 MG/1
70 TABLET ORAL
Qty: 4 TABLET | Refills: 11 | Status: SHIPPED | OUTPATIENT
Start: 2018-06-19 | End: 2018-06-19 | Stop reason: SDUPTHER

## 2018-06-19 ASSESSMENT — ENCOUNTER SYMPTOMS
GASTROINTESTINAL NEGATIVE: 1
RESPIRATORY NEGATIVE: 1

## 2018-07-16 ENCOUNTER — HOSPITAL ENCOUNTER (EMERGENCY)
Age: 83
Discharge: HOME | End: 2018-07-16
Payer: MEDICARE

## 2018-07-16 ENCOUNTER — HOSPITAL ENCOUNTER (EMERGENCY)
Dept: GENERAL RADIOLOGY | Age: 83
Discharge: HOME | End: 2018-07-16
Payer: MEDICARE

## 2018-07-16 VITALS
WEIGHT: 111 LBS | BODY MASS INDEX: 21.79 KG/M2 | RESPIRATION RATE: 16 BRPM | SYSTOLIC BLOOD PRESSURE: 166 MMHG | HEIGHT: 60 IN | HEART RATE: 67 BPM | OXYGEN SATURATION: 97 % | DIASTOLIC BLOOD PRESSURE: 69 MMHG | TEMPERATURE: 97.6 F

## 2018-07-16 DIAGNOSIS — S93.601A SPRAIN OF RIGHT FOOT, INITIAL ENCOUNTER: Primary | ICD-10-CM

## 2018-07-16 PROCEDURE — 99213 OFFICE O/P EST LOW 20 MIN: CPT

## 2018-07-16 PROCEDURE — 99213 OFFICE O/P EST LOW 20 MIN: CPT | Performed by: NURSE PRACTITIONER

## 2018-07-16 PROCEDURE — 73630 X-RAY EXAM OF FOOT: CPT

## 2018-07-16 ASSESSMENT — PAIN DESCRIPTION - FREQUENCY: FREQUENCY: CONTINUOUS

## 2018-07-16 ASSESSMENT — PAIN DESCRIPTION - LOCATION: LOCATION: FOOT;TOE (COMMENT WHICH ONE)

## 2018-07-16 ASSESSMENT — PAIN DESCRIPTION - ORIENTATION: ORIENTATION: RIGHT

## 2018-07-16 ASSESSMENT — PAIN SCALES - GENERAL: PAINLEVEL_OUTOF10: 8

## 2018-07-16 ASSESSMENT — PAIN DESCRIPTION - PAIN TYPE: TYPE: ACUTE PAIN

## 2018-07-16 ASSESSMENT — PAIN DESCRIPTION - DESCRIPTORS: DESCRIPTORS: DISCOMFORT;RADIATING

## 2018-08-01 ENCOUNTER — HOSPITAL ENCOUNTER (OUTPATIENT)
Dept: INTERVENTIONAL RADIOLOGY/VASCULAR | Age: 83
Discharge: HOME | End: 2018-08-01
Payer: MEDICARE

## 2018-08-01 DIAGNOSIS — R09.89 BRUIT: ICD-10-CM

## 2018-08-01 DIAGNOSIS — R42 DIZZINESS: ICD-10-CM

## 2018-08-01 DIAGNOSIS — R09.89 OTHER SPECIFIED SYMPTOMS AND SIGNS INVOLVING THE CIRCULATORY AND RESPIRATORY SYSTEMS: ICD-10-CM

## 2018-08-01 PROCEDURE — 93880 EXTRACRANIAL BILAT STUDY: CPT

## 2018-09-07 LAB — TSH SERPL DL<=0.05 MIU/L-ACNC: 0.09 UIU/ML

## 2018-09-10 DIAGNOSIS — E03.9 HYPOTHYROIDISM, UNSPECIFIED TYPE: Primary | ICD-10-CM

## 2018-09-11 DIAGNOSIS — E03.9 HYPOTHYROIDISM, UNSPECIFIED TYPE: ICD-10-CM

## 2018-09-11 RX ORDER — LEVOTHYROXINE SODIUM 0.1 MG/1
100 TABLET ORAL DAILY
Qty: 30 TABLET | Refills: 2 | Status: SHIPPED | OUTPATIENT
Start: 2018-09-11 | End: 2018-09-11 | Stop reason: SDUPTHER

## 2018-09-11 RX ORDER — LEVOTHYROXINE SODIUM 0.1 MG/1
100 TABLET ORAL DAILY
Qty: 90 TABLET | Refills: 2 | Status: SHIPPED | OUTPATIENT
Start: 2018-09-11 | End: 2018-01-01

## 2018-09-19 ENCOUNTER — OFFICE VISIT (OUTPATIENT)
Dept: FAMILY MEDICINE CLINIC | Age: 83
End: 2018-09-19
Payer: MEDICARE

## 2018-09-19 VITALS
HEIGHT: 60 IN | SYSTOLIC BLOOD PRESSURE: 132 MMHG | WEIGHT: 109.1 LBS | BODY MASS INDEX: 21.42 KG/M2 | HEART RATE: 56 BPM | RESPIRATION RATE: 16 BRPM | OXYGEN SATURATION: 93 % | DIASTOLIC BLOOD PRESSURE: 70 MMHG

## 2018-09-19 DIAGNOSIS — N18.9 CHRONIC KIDNEY DISEASE, UNSPECIFIED CKD STAGE: ICD-10-CM

## 2018-09-19 DIAGNOSIS — K21.9 GASTROESOPHAGEAL REFLUX DISEASE, ESOPHAGITIS PRESENCE NOT SPECIFIED: ICD-10-CM

## 2018-09-19 DIAGNOSIS — E03.9 HYPOTHYROIDISM, UNSPECIFIED TYPE: Primary | ICD-10-CM

## 2018-09-19 DIAGNOSIS — Z23 NEED FOR VACCINATION WITH 13-POLYVALENT PNEUMOCOCCAL CONJUGATE VACCINE: ICD-10-CM

## 2018-09-19 DIAGNOSIS — E78.49 OTHER HYPERLIPIDEMIA: ICD-10-CM

## 2018-09-19 DIAGNOSIS — I65.29 ASYMPTOMATIC CAROTID ARTERY STENOSIS, UNSPECIFIED LATERALITY: ICD-10-CM

## 2018-09-19 DIAGNOSIS — M81.8 OTHER OSTEOPOROSIS WITHOUT CURRENT PATHOLOGICAL FRACTURE: ICD-10-CM

## 2018-09-19 DIAGNOSIS — R53.1 GENERAL WEAKNESS: ICD-10-CM

## 2018-09-19 PROCEDURE — G8420 CALC BMI NORM PARAMETERS: HCPCS | Performed by: FAMILY MEDICINE

## 2018-09-19 PROCEDURE — G8599 NO ASA/ANTIPLAT THER USE RNG: HCPCS | Performed by: FAMILY MEDICINE

## 2018-09-19 PROCEDURE — 90670 PCV13 VACCINE IM: CPT | Performed by: FAMILY MEDICINE

## 2018-09-19 PROCEDURE — 4040F PNEUMOC VAC/ADMIN/RCVD: CPT | Performed by: FAMILY MEDICINE

## 2018-09-19 PROCEDURE — 1036F TOBACCO NON-USER: CPT | Performed by: FAMILY MEDICINE

## 2018-09-19 PROCEDURE — G8427 DOCREV CUR MEDS BY ELIG CLIN: HCPCS | Performed by: FAMILY MEDICINE

## 2018-09-19 PROCEDURE — G0009 ADMIN PNEUMOCOCCAL VACCINE: HCPCS | Performed by: FAMILY MEDICINE

## 2018-09-19 PROCEDURE — 1123F ACP DISCUSS/DSCN MKR DOCD: CPT | Performed by: FAMILY MEDICINE

## 2018-09-19 PROCEDURE — 1101F PT FALLS ASSESS-DOCD LE1/YR: CPT | Performed by: FAMILY MEDICINE

## 2018-09-19 PROCEDURE — 1090F PRES/ABSN URINE INCON ASSESS: CPT | Performed by: FAMILY MEDICINE

## 2018-09-19 PROCEDURE — 99214 OFFICE O/P EST MOD 30 MIN: CPT | Performed by: FAMILY MEDICINE

## 2018-09-19 ASSESSMENT — ENCOUNTER SYMPTOMS
RESPIRATORY NEGATIVE: 1
GASTROINTESTINAL NEGATIVE: 1

## 2018-09-19 NOTE — PROGRESS NOTES
Yes Genna Singleton DO   amLODIPine-benazepril (LOTREL) 5-20 MG per capsule TAKE 1 CAPSULE BY MOUTH DAILY 4/16/18  Yes Genna Singleton DO   Potassium 99 MG TABS Take 2 tablets by mouth daily   Yes Historical Provider, MD   atorvastatin (LIPITOR) 20 MG tablet Take 20 mg by mouth daily   Yes Historical Provider, MD   ranitidine (ZANTAC) 150 MG tablet Take 150 mg by mouth nightly   Yes Historical Provider, MD   Calcium Carbonate (CALCIUM 600 PO) Take 2 tablets by mouth daily   Yes Historical Provider, MD   GLUCOSAMINE-CHONDROITIN PO Take 1 tablet by mouth 2 times daily   Yes Historical Provider, MD   Ferrous Sulfate (IRON) 325 (65 Fe) MG TABS Take 2 tablets by mouth daily   Yes Historical Provider, MD   Cholecalciferol (VITAMIN D3) 2000 units CAPS Take 1 capsule by mouth daily   Yes Historical Provider, MD   Docusate Calcium (STOOL SOFTENER PO) Take 2 tablets by mouth nightly   Yes Historical Provider, MD   Multiple Vitamin (DAILY VITAMIN FORMULA PO) Take 1 tablet by mouth daily. Yes Historical Provider, MD   metoprolol (TOPROL-XL) 50 MG XL tablet Take 50 mg by mouth 2 times daily.      Yes Historical Provider, MD   denosumab (PROLIA) 60 MG/ML SOLN SC injection Inject 1 mL into the skin once for 1 dose Every 6 mos 3/29/18 3/29/18  Genna Singleton DO       Lab Results   Component Value Date    LABA1C 5.7 03/02/2018     No results found for: EAG    No components found for: CHLPL  No results found for: TRIG  No results found for: HDL  No results found for: LDLCALC  No results found for: LABVLDL      Chemistry        Component Value Date/Time     12/26/2017 1221    K 4.3 12/26/2017 1221     12/26/2017 1221    CO2 27 12/26/2017 1221    BUN 34 (H) 12/26/2017 1221    CREATININE 1.8 (H) 12/26/2017 1221        Component Value Date/Time    CALCIUM 8.8 12/26/2017 1221    ALKPHOS 69 11/27/2012 0917    AST 20 11/27/2012 0917    ALT 11 11/27/2012 0917    BILITOT 0.4 11/27/2012 0917            Lab Results Component Value Date    TSH 0.086 09/07/2018       Lab Results   Component Value Date    WBC 9.3 12/26/2017    HGB 12.0 12/26/2017    HCT 35.9 (L) 12/26/2017    MCV 93.3 12/26/2017     12/26/2017         Health Maintenance   Topic Date Due    DTaP/Tdap/Td vaccine (1 - Tdap) 06/27/1948    Shingles Vaccine (1 of 2 - 2 Dose Series) 06/27/1979    Pneumococcal low/med risk (1 of 2 - PCV13) 06/27/1994    Flu vaccine (1) 09/01/2018    Potassium monitoring  12/26/2018    Creatinine monitoring  12/26/2018    TSH testing  09/07/2019       Immunization History   Administered Date(s) Administered    Hepatitis B, unspecified formulation 04/17/2008, 05/20/2008, 10/20/2008         Review of Systems   Constitutional: Negative. HENT: Negative. Respiratory: Negative. Cardiovascular: Negative. Gastrointestinal: Negative. Musculoskeletal: Negative. All other systems reviewed and are negative. Objective:   Physical Exam   Constitutional: She is oriented to person, place, and time. She appears well-developed and well-nourished. HENT:   Head: Normocephalic and atraumatic. Right Ear: Tympanic membrane normal.   Left Ear: Tympanic membrane normal.   Mouth/Throat: Oropharynx is clear and moist and mucous membranes are normal.   Neck: Carotid bruit is present. Cardiovascular: Normal rate and regular rhythm. Murmur heard. Pulmonary/Chest: Effort normal and breath sounds normal.   Abdominal: Soft. Bowel sounds are normal.   Musculoskeletal: She exhibits no edema. Neurological: She is alert and oriented to person, place, and time. Skin: Skin is warm and dry. Psychiatric: She has a normal mood and affect. Her behavior is normal.   Nursing note and vitals reviewed. Assessment:       Diagnosis Orders   1. Hypothyroidism, unspecified type     2. Other hyperlipidemia     3. Other osteoporosis without current pathological fracture     4. Chronic kidney disease, unspecified CKD stage     5.

## 2018-12-17 NOTE — PROGRESS NOTES
Visit Information    Have you changed or started any medications since your last visit including any over-the-counter medicines, vitamins, or herbal medicines? no   Are you having any side effects from any of your medications? -  no  Have you stopped taking any of your medications? Is so, why? -  no    Have you seen any other physician or provider since your last visit? No  Have you had any other diagnostic tests since your last visit? Yes - Records Obtained  Have you been seen in the emergency room and/or had an admission to a hospital since we last saw you? No  Have you had your routine dental cleaning in the past 6 months? no    Have you activated your [employer] account? If not, what are your barriers?  No: pt choice     Patient Care Team:  Lina Mathias DO as PCP - General (Family Medicine)  Diandra Au MD as Physician (Cardiology)  Juan Trinidad MD as Physician (Nephrology)    Medical History Review  Past Medical, Family, and Social History reviewed and does contribute to the patient presenting condition    Health Maintenance   Topic Date Due    DTaP/Tdap/Td vaccine (1 - Tdap) 06[birth year]27/1948    Shingles Vaccine (1 of 2 - 2 Dose Series) 06/27/1979    Potassium monitoring  12/26/2018    Creatinine monitoring  12/26/2018    Pneumococcal low/med risk (2 of 2 - PPSV23) 09/19/2019    TSH testing  12/07/2019    Flu vaccine  Completed
type  levothyroxine (SYNTHROID) 75 MCG tablet    TSH with Reflex   5. Orthostatic lightheadedness     6. Chronic kidney disease, unspecified CKD stage     7. Essential hypertension  benazepril (LOTENSIN) 20 MG tablet   8.  Short-term memory loss             Plan:      -  Chronic medical problems stable  -  Continue current medications with the exception of amlodipine, will hold at this time to see if helps with her occasional lightheadedness  -  Hold Lipitor x 2 weeks to see if myalgias improve  -  Decrease Synthroid  -  Follow up with specialists as scheduled  -  Recheck TSH 3 mos  -  RTO 3 mos          Gaviota Parents, DO

## 2019-01-01 ENCOUNTER — TELEPHONE (OUTPATIENT)
Dept: FAMILY MEDICINE CLINIC | Age: 84
End: 2019-01-01

## 2019-01-01 ENCOUNTER — APPOINTMENT (OUTPATIENT)
Dept: CT IMAGING | Age: 84
DRG: 445 | End: 2019-01-01
Payer: MEDICARE

## 2019-01-01 ENCOUNTER — APPOINTMENT (OUTPATIENT)
Dept: GENERAL RADIOLOGY | Age: 84
DRG: 445 | End: 2019-01-01
Payer: MEDICARE

## 2019-01-01 ENCOUNTER — CARE COORDINATION (OUTPATIENT)
Dept: CARE COORDINATION | Age: 84
End: 2019-01-01

## 2019-01-01 ENCOUNTER — CARE COORDINATION (OUTPATIENT)
Dept: CASE MANAGEMENT | Age: 84
End: 2019-01-01

## 2019-01-01 ENCOUNTER — HOSPITAL ENCOUNTER (OUTPATIENT)
Dept: MRI IMAGING | Age: 84
Discharge: HOME | End: 2019-03-12
Payer: MEDICARE

## 2019-01-01 ENCOUNTER — OFFICE VISIT (OUTPATIENT)
Dept: FAMILY MEDICINE CLINIC | Age: 84
End: 2019-01-01
Payer: MEDICARE

## 2019-01-01 ENCOUNTER — OFFICE VISIT (OUTPATIENT)
Dept: SURGERY | Age: 84
End: 2019-01-01
Payer: MEDICARE

## 2019-01-01 ENCOUNTER — APPOINTMENT (OUTPATIENT)
Dept: NUCLEAR MEDICINE | Age: 84
DRG: 445 | End: 2019-01-01
Payer: MEDICARE

## 2019-01-01 ENCOUNTER — APPOINTMENT (OUTPATIENT)
Dept: NUCLEAR MEDICINE | Age: 84
DRG: 640 | End: 2019-01-01
Payer: MEDICARE

## 2019-01-01 ENCOUNTER — APPOINTMENT (OUTPATIENT)
Dept: ULTRASOUND IMAGING | Age: 84
DRG: 640 | End: 2019-01-01
Payer: MEDICARE

## 2019-01-01 ENCOUNTER — HOSPITAL ENCOUNTER (INPATIENT)
Age: 84
LOS: 2 days | DRG: 640 | End: 2019-10-25
Attending: EMERGENCY MEDICINE | Admitting: FAMILY MEDICINE
Payer: MEDICARE

## 2019-01-01 ENCOUNTER — HOSPITAL ENCOUNTER (INPATIENT)
Age: 84
LOS: 5 days | Discharge: HOME HEALTH | DRG: 445 | End: 2019-03-25
Attending: SURGERY | Admitting: SURGERY
Payer: MEDICARE

## 2019-01-01 VITALS
HEIGHT: 59 IN | DIASTOLIC BLOOD PRESSURE: 68 MMHG | OXYGEN SATURATION: 96 % | SYSTOLIC BLOOD PRESSURE: 118 MMHG | BODY MASS INDEX: 21.63 KG/M2 | HEART RATE: 65 BPM | WEIGHT: 107.3 LBS | TEMPERATURE: 97.4 F | RESPIRATION RATE: 15 BRPM

## 2019-01-01 VITALS
BODY MASS INDEX: 20.64 KG/M2 | HEART RATE: 72 BPM | WEIGHT: 103.9 LBS | OXYGEN SATURATION: 97 % | SYSTOLIC BLOOD PRESSURE: 142 MMHG | DIASTOLIC BLOOD PRESSURE: 82 MMHG | RESPIRATION RATE: 16 BRPM

## 2019-01-01 VITALS
HEIGHT: 60 IN | OXYGEN SATURATION: 91 % | RESPIRATION RATE: 18 BRPM | DIASTOLIC BLOOD PRESSURE: 65 MMHG | HEART RATE: 80 BPM | TEMPERATURE: 97.5 F | BODY MASS INDEX: 20.44 KG/M2 | SYSTOLIC BLOOD PRESSURE: 154 MMHG | WEIGHT: 104.1 LBS

## 2019-01-01 VITALS
HEIGHT: 62 IN | RESPIRATION RATE: 16 BRPM | HEART RATE: 64 BPM | DIASTOLIC BLOOD PRESSURE: 64 MMHG | SYSTOLIC BLOOD PRESSURE: 132 MMHG | WEIGHT: 101.6 LBS | BODY MASS INDEX: 18.69 KG/M2

## 2019-01-01 VITALS
HEIGHT: 60 IN | HEART RATE: 72 BPM | SYSTOLIC BLOOD PRESSURE: 138 MMHG | DIASTOLIC BLOOD PRESSURE: 72 MMHG | BODY MASS INDEX: 19.32 KG/M2 | OXYGEN SATURATION: 96 % | RESPIRATION RATE: 20 BRPM | WEIGHT: 98.4 LBS

## 2019-01-01 VITALS
SYSTOLIC BLOOD PRESSURE: 116 MMHG | OXYGEN SATURATION: 98 % | DIASTOLIC BLOOD PRESSURE: 50 MMHG | BODY MASS INDEX: 19.57 KG/M2 | RESPIRATION RATE: 20 BRPM | HEART RATE: 67 BPM | WEIGHT: 107 LBS | TEMPERATURE: 96 F

## 2019-01-01 VITALS
BODY MASS INDEX: 21.02 KG/M2 | DIASTOLIC BLOOD PRESSURE: 80 MMHG | SYSTOLIC BLOOD PRESSURE: 132 MMHG | RESPIRATION RATE: 16 BRPM | HEART RATE: 60 BPM | WEIGHT: 105.8 LBS

## 2019-01-01 VITALS
BODY MASS INDEX: 19.63 KG/M2 | SYSTOLIC BLOOD PRESSURE: 140 MMHG | DIASTOLIC BLOOD PRESSURE: 60 MMHG | HEART RATE: 64 BPM | RESPIRATION RATE: 20 BRPM | WEIGHT: 107.3 LBS

## 2019-01-01 VITALS
BODY MASS INDEX: 18.97 KG/M2 | TEMPERATURE: 97.7 F | HEIGHT: 62 IN | WEIGHT: 103.1 LBS | SYSTOLIC BLOOD PRESSURE: 168 MMHG | HEART RATE: 68 BPM | OXYGEN SATURATION: 95 % | DIASTOLIC BLOOD PRESSURE: 72 MMHG | RESPIRATION RATE: 16 BRPM

## 2019-01-01 DIAGNOSIS — E03.9 HYPOTHYROIDISM, UNSPECIFIED TYPE: ICD-10-CM

## 2019-01-01 DIAGNOSIS — K82.9 GALLBLADDER DISEASE: ICD-10-CM

## 2019-01-01 DIAGNOSIS — R11.2 NAUSEA AND VOMITING, INTRACTABILITY OF VOMITING NOT SPECIFIED, UNSPECIFIED VOMITING TYPE: ICD-10-CM

## 2019-01-01 DIAGNOSIS — F51.01 PRIMARY INSOMNIA: ICD-10-CM

## 2019-01-01 DIAGNOSIS — N18.9 CHRONIC KIDNEY DISEASE, UNSPECIFIED CKD STAGE: ICD-10-CM

## 2019-01-01 DIAGNOSIS — K81.0 ACUTE CHOLECYSTITIS: Primary | ICD-10-CM

## 2019-01-01 DIAGNOSIS — F03.90 DEMENTIA WITHOUT BEHAVIORAL DISTURBANCE, UNSPECIFIED DEMENTIA TYPE: ICD-10-CM

## 2019-01-01 DIAGNOSIS — R63.4 UNINTENDED WEIGHT LOSS: Primary | ICD-10-CM

## 2019-01-01 DIAGNOSIS — D50.9 IRON DEFICIENCY ANEMIA, UNSPECIFIED IRON DEFICIENCY ANEMIA TYPE: ICD-10-CM

## 2019-01-01 DIAGNOSIS — E83.52 HYPERCALCEMIA: Primary | ICD-10-CM

## 2019-01-01 DIAGNOSIS — R26.81 UNSTABLE GAIT: ICD-10-CM

## 2019-01-01 DIAGNOSIS — R53.1 GENERAL WEAKNESS: ICD-10-CM

## 2019-01-01 DIAGNOSIS — R79.89 ELEVATED BRAIN NATRIURETIC PEPTIDE (BNP) LEVEL: ICD-10-CM

## 2019-01-01 DIAGNOSIS — I10 ESSENTIAL HYPERTENSION: ICD-10-CM

## 2019-01-01 DIAGNOSIS — K81.0 CHOLECYSTITIS, ACUTE: Primary | ICD-10-CM

## 2019-01-01 DIAGNOSIS — I51.89 DIASTOLIC DYSFUNCTION: ICD-10-CM

## 2019-01-01 DIAGNOSIS — N18.30 CHRONIC KIDNEY DISEASE, STAGE III (MODERATE) (HCC): ICD-10-CM

## 2019-01-01 DIAGNOSIS — R41.3 MEMORY LOSS: ICD-10-CM

## 2019-01-01 DIAGNOSIS — I65.29 ASYMPTOMATIC CAROTID ARTERY STENOSIS, UNSPECIFIED LATERALITY: ICD-10-CM

## 2019-01-01 DIAGNOSIS — R63.4 UNINTENDED WEIGHT LOSS: ICD-10-CM

## 2019-01-01 DIAGNOSIS — L98.9 NON-HEALING SKIN LESION: Primary | ICD-10-CM

## 2019-01-01 DIAGNOSIS — F03.90 DEMENTIA WITHOUT BEHAVIORAL DISTURBANCE, UNSPECIFIED DEMENTIA TYPE: Primary | ICD-10-CM

## 2019-01-01 DIAGNOSIS — R53.1 GENERAL WEAKNESS: Primary | ICD-10-CM

## 2019-01-01 DIAGNOSIS — B02.9 HERPES ZOSTER WITHOUT COMPLICATION: ICD-10-CM

## 2019-01-01 DIAGNOSIS — E44.0 MODERATE MALNUTRITION (HCC): ICD-10-CM

## 2019-01-01 DIAGNOSIS — R41.3 MEMORY LOSS: Primary | ICD-10-CM

## 2019-01-01 DIAGNOSIS — K82.9 GALLBLADDER DISEASE: Primary | ICD-10-CM

## 2019-01-01 DIAGNOSIS — E11.9 DIET-CONTROLLED TYPE 2 DIABETES MELLITUS (HCC): ICD-10-CM

## 2019-01-01 DIAGNOSIS — D64.9 ANEMIA, UNSPECIFIED TYPE: ICD-10-CM

## 2019-01-01 LAB
ALBUMIN SERPL-MCNC: 3.2 G/DL (ref 3.5–5.1)
ALBUMIN SERPL-MCNC: 3.3 G/DL (ref 3.5–5.1)
ALP BLD-CCNC: 119 U/L (ref 38–126)
ALP BLD-CCNC: 60 U/L (ref 38–126)
ALT SERPL-CCNC: 23 U/L (ref 11–66)
ALT SERPL-CCNC: < 5 U/L (ref 11–66)
AMORPHOUS: ABNORMAL
ANION GAP SERPL CALCULATED.3IONS-SCNC: 11 MEQ/L (ref 8–16)
ANION GAP SERPL CALCULATED.3IONS-SCNC: 12 MEQ/L (ref 8–16)
ANION GAP SERPL CALCULATED.3IONS-SCNC: 13 MEQ/L (ref 8–16)
ANION GAP SERPL CALCULATED.3IONS-SCNC: 16 MEQ/L (ref 8–16)
ANION GAP SERPL CALCULATED.3IONS-SCNC: 18 MEQ/L (ref 8–16)
ANION GAP SERPL CALCULATED.3IONS-SCNC: 9 MEQ/L (ref 8–16)
ANISOCYTOSIS: PRESENT
AST SERPL-CCNC: 12 U/L (ref 5–40)
AST SERPL-CCNC: 18 U/L (ref 5–40)
BACTERIA: ABNORMAL /HPF
BACTERIA: ABNORMAL /HPF
BASOPHILS # BLD: 0.2 %
BASOPHILS # BLD: 0.4 %
BASOPHILS ABSOLUTE: 0 THOU/MM3 (ref 0–0.1)
BILIRUB SERPL-MCNC: 0.4 MG/DL (ref 0.3–1.2)
BILIRUB SERPL-MCNC: 0.6 MG/DL (ref 0.3–1.2)
BILIRUBIN URINE: NEGATIVE
BILIRUBIN URINE: NEGATIVE
BLOOD, URINE: ABNORMAL
BLOOD, URINE: NEGATIVE
BUN BLDV-MCNC: 38 MG/DL (ref 7–22)
BUN BLDV-MCNC: 39 MG/DL (ref 7–22)
BUN BLDV-MCNC: 41 MG/DL (ref 7–22)
BUN BLDV-MCNC: 42 MG/DL (ref 7–22)
BUN BLDV-MCNC: 46 MG/DL (ref 7–22)
BUN BLDV-MCNC: 60 MG/DL (ref 7–22)
BUN BLDV-MCNC: 64 MG/DL (ref 7–22)
BUN BLDV-MCNC: 67 MG/DL (ref 7–22)
BUN BLDV-MCNC: 68 MG/DL (ref 7–22)
BUN BLDV-MCNC: 73 MG/DL (ref 7–22)
CALCIUM IONIZED: 1.66 MMOL/L (ref 1.12–1.32)
CALCIUM SERPL-MCNC: 11.3 MG/DL (ref 8.5–10.5)
CALCIUM SERPL-MCNC: 11.4 MG/DL (ref 8.5–10.5)
CALCIUM SERPL-MCNC: 11.4 MG/DL (ref 8.5–10.5)
CALCIUM SERPL-MCNC: 11.8 MG/DL (ref 8.5–10.5)
CALCIUM SERPL-MCNC: 13.9 MG/DL (ref 8.5–10.5)
CALCIUM SERPL-MCNC: 9.2 MG/DL (ref 8.5–10.5)
CALCIUM SERPL-MCNC: 9.3 MG/DL (ref 8.5–10.5)
CALCIUM SERPL-MCNC: 9.5 MG/DL (ref 8.5–10.5)
CASTS 2: ABNORMAL /LPF
CASTS 2: ABNORMAL /LPF
CASTS UA: ABNORMAL /LPF
CASTS UA: ABNORMAL /LPF
CHARACTER, URINE: CLEAR
CHARACTER, URINE: CLEAR
CHLORIDE BLD-SCNC: 102 MEQ/L (ref 98–111)
CHLORIDE BLD-SCNC: 104 MEQ/L (ref 98–111)
CHLORIDE BLD-SCNC: 105 MEQ/L (ref 98–111)
CHLORIDE BLD-SCNC: 105 MEQ/L (ref 98–111)
CHLORIDE BLD-SCNC: 107 MEQ/L (ref 98–111)
CHLORIDE BLD-SCNC: 108 MEQ/L (ref 98–111)
CHLORIDE BLD-SCNC: 109 MEQ/L (ref 98–111)
CHLORIDE BLD-SCNC: 111 MEQ/L (ref 98–111)
CO2: 20 MEQ/L (ref 23–33)
CO2: 20 MEQ/L (ref 23–33)
CO2: 21 MEQ/L (ref 23–33)
CO2: 22 MEQ/L (ref 23–33)
CO2: 23 MEQ/L (ref 23–33)
CO2: 25 MEQ/L (ref 23–33)
CO2: 27 MEQ/L (ref 23–33)
CO2: 28 MEQ/L (ref 23–33)
COLOR: YELLOW
COLOR: YELLOW
CREAT SERPL-MCNC: 1.4 MG/DL (ref 0.4–1.2)
CREAT SERPL-MCNC: 1.5 MG/DL (ref 0.4–1.2)
CREAT SERPL-MCNC: 1.6 MG/DL (ref 0.4–1.2)
CREAT SERPL-MCNC: 2.4 MG/DL (ref 0.4–1.2)
CREAT SERPL-MCNC: 2.5 MG/DL (ref 0.4–1.2)
CREAT SERPL-MCNC: 2.6 MG/DL (ref 0.4–1.2)
CREAT SERPL-MCNC: 2.6 MG/DL (ref 0.4–1.2)
CREAT SERPL-MCNC: 2.8 MG/DL (ref 0.4–1.2)
CREATININE URINE: 88.4 MG/DL
CRYSTALS, UA: ABNORMAL
CRYSTALS, UA: ABNORMAL
EKG ATRIAL RATE: 59 BPM
EKG ATRIAL RATE: 61 BPM
EKG ATRIAL RATE: 86 BPM
EKG ATRIAL RATE: 90 BPM
EKG ATRIAL RATE: 91 BPM
EKG ATRIAL RATE: 92 BPM
EKG P AXIS: 63 DEGREES
EKG P AXIS: 75 DEGREES
EKG P AXIS: 80 DEGREES
EKG P AXIS: 85 DEGREES
EKG P AXIS: 93 DEGREES
EKG P-R INTERVAL: 118 MS
EKG P-R INTERVAL: 124 MS
EKG P-R INTERVAL: 128 MS
EKG P-R INTERVAL: 136 MS
EKG P-R INTERVAL: 136 MS
EKG Q-T INTERVAL: 348 MS
EKG Q-T INTERVAL: 376 MS
EKG Q-T INTERVAL: 382 MS
EKG Q-T INTERVAL: 394 MS
EKG Q-T INTERVAL: 492 MS
EKG Q-T INTERVAL: 496 MS
EKG QRS DURATION: 136 MS
EKG QRS DURATION: 140 MS
EKG QRS DURATION: 142 MS
EKG QRS DURATION: 144 MS
EKG QRS DURATION: 154 MS
EKG QRS DURATION: 154 MS
EKG QTC CALCULATION (BAZETT): 439 MS
EKG QTC CALCULATION (BAZETT): 457 MS
EKG QTC CALCULATION (BAZETT): 464 MS
EKG QTC CALCULATION (BAZETT): 484 MS
EKG QTC CALCULATION (BAZETT): 487 MS
EKG QTC CALCULATION (BAZETT): 499 MS
EKG R AXIS: 69 DEGREES
EKG R AXIS: 79 DEGREES
EKG R AXIS: 83 DEGREES
EKG R AXIS: 83 DEGREES
EKG R AXIS: 87 DEGREES
EKG R AXIS: 87 DEGREES
EKG T AXIS: 0 DEGREES
EKG T AXIS: 12 DEGREES
EKG T AXIS: 22 DEGREES
EKG T AXIS: 27 DEGREES
EKG T AXIS: 33 DEGREES
EKG T AXIS: 7 DEGREES
EKG VENTRICULAR RATE: 59 BPM
EKG VENTRICULAR RATE: 61 BPM
EKG VENTRICULAR RATE: 86 BPM
EKG VENTRICULAR RATE: 91 BPM
EKG VENTRICULAR RATE: 92 BPM
EKG VENTRICULAR RATE: 96 BPM
EOSINOPHIL # BLD: 0.1 %
EOSINOPHIL # BLD: 0.4 %
EOSINOPHIL # BLD: 0.4 %
EOSINOPHIL # BLD: 0.5 %
EOSINOPHILS ABSOLUTE: 0 THOU/MM3 (ref 0–0.4)
EOSINOPHILS ABSOLUTE: 0 THOU/MM3 (ref 0–0.4)
EOSINOPHILS ABSOLUTE: 0.1 THOU/MM3 (ref 0–0.4)
EOSINOPHILS ABSOLUTE: 0.1 THOU/MM3 (ref 0–0.4)
EPITHELIAL CELLS, UA: ABNORMAL /HPF
EPITHELIAL CELLS, UA: ABNORMAL /HPF
ERYTHROCYTE [DISTWIDTH] IN BLOOD BY AUTOMATED COUNT: 14.6 % (ref 11.5–14.5)
ERYTHROCYTE [DISTWIDTH] IN BLOOD BY AUTOMATED COUNT: 14.7 % (ref 11.5–14.5)
ERYTHROCYTE [DISTWIDTH] IN BLOOD BY AUTOMATED COUNT: 14.9 % (ref 11.5–14.5)
ERYTHROCYTE [DISTWIDTH] IN BLOOD BY AUTOMATED COUNT: 15 % (ref 11.5–14.5)
ERYTHROCYTE [DISTWIDTH] IN BLOOD BY AUTOMATED COUNT: 20.8 % (ref 11.5–14.5)
ERYTHROCYTE [DISTWIDTH] IN BLOOD BY AUTOMATED COUNT: 21.2 % (ref 11.5–14.5)
ERYTHROCYTE [DISTWIDTH] IN BLOOD BY AUTOMATED COUNT: 22 % (ref 11.5–14.5)
ERYTHROCYTE [DISTWIDTH] IN BLOOD BY AUTOMATED COUNT: 52.6 FL (ref 35–45)
ERYTHROCYTE [DISTWIDTH] IN BLOOD BY AUTOMATED COUNT: 52.8 FL (ref 35–45)
ERYTHROCYTE [DISTWIDTH] IN BLOOD BY AUTOMATED COUNT: 52.9 FL (ref 35–45)
ERYTHROCYTE [DISTWIDTH] IN BLOOD BY AUTOMATED COUNT: 53.7 FL (ref 35–45)
ERYTHROCYTE [DISTWIDTH] IN BLOOD BY AUTOMATED COUNT: 68.8 FL (ref 35–45)
ERYTHROCYTE [DISTWIDTH] IN BLOOD BY AUTOMATED COUNT: 70.6 FL (ref 35–45)
ERYTHROCYTE [DISTWIDTH] IN BLOOD BY AUTOMATED COUNT: 71.5 FL (ref 35–45)
FERRITIN: 1198 NG/ML (ref 10–291)
FLU A ANTIGEN: NEGATIVE
FLU B ANTIGEN: NEGATIVE
FOLATE: > 20 NG/ML (ref 4.8–24.2)
GFR SERPL CREATININE-BSD FRML MDRD: 16 ML/MIN/1.73M2
GFR SERPL CREATININE-BSD FRML MDRD: 17 ML/MIN/1.73M2
GFR SERPL CREATININE-BSD FRML MDRD: 17 ML/MIN/1.73M2
GFR SERPL CREATININE-BSD FRML MDRD: 18 ML/MIN/1.73M2
GFR SERPL CREATININE-BSD FRML MDRD: 19 ML/MIN/1.73M2
GFR SERPL CREATININE-BSD FRML MDRD: 30 ML/MIN/1.73M2
GFR SERPL CREATININE-BSD FRML MDRD: 33 ML/MIN/1.73M2
GFR SERPL CREATININE-BSD FRML MDRD: 35 ML/MIN/1.73M2
GLUCOSE BLD-MCNC: 100 MG/DL (ref 70–108)
GLUCOSE BLD-MCNC: 100 MG/DL (ref 70–108)
GLUCOSE BLD-MCNC: 101 MG/DL (ref 70–108)
GLUCOSE BLD-MCNC: 102 MG/DL (ref 70–108)
GLUCOSE BLD-MCNC: 109 MG/DL (ref 70–108)
GLUCOSE BLD-MCNC: 111 MG/DL (ref 70–108)
GLUCOSE BLD-MCNC: 112 MG/DL (ref 70–108)
GLUCOSE BLD-MCNC: 114 MG/DL (ref 70–108)
GLUCOSE BLD-MCNC: 115 MG/DL (ref 70–108)
GLUCOSE BLD-MCNC: 117 MG/DL (ref 70–108)
GLUCOSE BLD-MCNC: 125 MG/DL (ref 70–108)
GLUCOSE BLD-MCNC: 128 MG/DL (ref 70–108)
GLUCOSE BLD-MCNC: 129 MG/DL (ref 70–108)
GLUCOSE BLD-MCNC: 130 MG/DL (ref 70–108)
GLUCOSE BLD-MCNC: 136 MG/DL (ref 70–108)
GLUCOSE BLD-MCNC: 137 MG/DL (ref 70–108)
GLUCOSE BLD-MCNC: 137 MG/DL (ref 70–108)
GLUCOSE BLD-MCNC: 139 MG/DL (ref 70–108)
GLUCOSE BLD-MCNC: 142 MG/DL (ref 70–108)
GLUCOSE BLD-MCNC: 160 MG/DL (ref 70–108)
GLUCOSE BLD-MCNC: 161 MG/DL (ref 70–108)
GLUCOSE BLD-MCNC: 161 MG/DL (ref 70–108)
GLUCOSE BLD-MCNC: 162 MG/DL (ref 70–108)
GLUCOSE BLD-MCNC: 165 MG/DL (ref 70–108)
GLUCOSE BLD-MCNC: 177 MG/DL (ref 70–108)
GLUCOSE BLD-MCNC: 177 MG/DL (ref 70–108)
GLUCOSE BLD-MCNC: 182 MG/DL (ref 70–108)
GLUCOSE BLD-MCNC: 191 MG/DL (ref 70–108)
GLUCOSE URINE: NEGATIVE MG/DL
GLUCOSE URINE: NEGATIVE MG/DL
HCT VFR BLD CALC: 30.6 % (ref 37–47)
HCT VFR BLD CALC: 31 % (ref 37–47)
HCT VFR BLD CALC: 31.8 % (ref 37–47)
HCT VFR BLD CALC: 31.8 % (ref 37–47)
HCT VFR BLD CALC: 33.9 % (ref 37–47)
HCT VFR BLD CALC: 34.7 % (ref 37–47)
HCT VFR BLD CALC: 39.8 % (ref 37–47)
HEMOGLOBIN: 10.3 GM/DL (ref 12–16)
HEMOGLOBIN: 11.4 GM/DL (ref 12–16)
HEMOGLOBIN: 9 GM/DL (ref 12–16)
HEMOGLOBIN: 9.5 GM/DL (ref 12–16)
HEMOGLOBIN: 9.5 GM/DL (ref 12–16)
HEMOGLOBIN: 9.6 GM/DL (ref 12–16)
HEMOGLOBIN: 9.9 GM/DL (ref 12–16)
HYPOCHROMIA: PRESENT
IMMATURE GRANS (ABS): 0.05 THOU/MM3 (ref 0–0.07)
IMMATURE GRANS (ABS): 0.05 THOU/MM3 (ref 0–0.07)
IMMATURE GRANS (ABS): 0.06 THOU/MM3 (ref 0–0.07)
IMMATURE GRANS (ABS): 0.07 THOU/MM3 (ref 0–0.07)
IMMATURE GRANULOCYTES: 0.4 %
IMMATURE GRANULOCYTES: 0.5 %
IRON: 44 UG/DL (ref 50–170)
KETONES, URINE: NEGATIVE
KETONES, URINE: NEGATIVE
LEUKOCYTE ESTERASE, URINE: ABNORMAL
LEUKOCYTE ESTERASE, URINE: NEGATIVE
LV EF: 60 %
LVEF MODALITY: NORMAL
LYMPHOCYTES # BLD: 4.9 %
LYMPHOCYTES # BLD: 5.2 %
LYMPHOCYTES # BLD: 7 %
LYMPHOCYTES # BLD: 8.9 %
LYMPHOCYTES ABSOLUTE: 0.6 THOU/MM3 (ref 1–4.8)
LYMPHOCYTES ABSOLUTE: 0.7 THOU/MM3 (ref 1–4.8)
LYMPHOCYTES ABSOLUTE: 0.8 THOU/MM3 (ref 1–4.8)
LYMPHOCYTES ABSOLUTE: 1.1 THOU/MM3 (ref 1–4.8)
MCH RBC QN AUTO: 26.6 PG (ref 26–33)
MCH RBC QN AUTO: 26.7 PG (ref 26–33)
MCH RBC QN AUTO: 26.7 PG (ref 26–33)
MCH RBC QN AUTO: 29.6 PG (ref 26–33)
MCH RBC QN AUTO: 29.9 PG (ref 26–33)
MCH RBC QN AUTO: 30.1 PG (ref 26–33)
MCH RBC QN AUTO: 30.1 PG (ref 26–33)
MCHC RBC AUTO-ENTMCNC: 28.3 GM/DL (ref 32.2–35.5)
MCHC RBC AUTO-ENTMCNC: 28.5 GM/DL (ref 32.2–35.5)
MCHC RBC AUTO-ENTMCNC: 28.6 GM/DL (ref 32.2–35.5)
MCHC RBC AUTO-ENTMCNC: 30.2 GM/DL (ref 32.2–35.5)
MCHC RBC AUTO-ENTMCNC: 30.4 GM/DL (ref 32.2–35.5)
MCHC RBC AUTO-ENTMCNC: 30.6 GM/DL (ref 32.2–35.5)
MCHC RBC AUTO-ENTMCNC: 31 GM/DL (ref 32.2–35.5)
MCV RBC AUTO: 92.8 FL (ref 81–99)
MCV RBC AUTO: 93.5 FL (ref 81–99)
MCV RBC AUTO: 94.4 FL (ref 81–99)
MCV RBC AUTO: 96.8 FL (ref 81–99)
MCV RBC AUTO: 98.1 FL (ref 81–99)
MCV RBC AUTO: 98.1 FL (ref 81–99)
MCV RBC AUTO: 98.3 FL (ref 81–99)
MISCELLANEOUS 2: ABNORMAL
MISCELLANEOUS 2: ABNORMAL
MONOCYTES # BLD: 2.3 %
MONOCYTES # BLD: 6.6 %
MONOCYTES # BLD: 6.8 %
MONOCYTES # BLD: 9.7 %
MONOCYTES ABSOLUTE: 0.3 THOU/MM3 (ref 0.4–1.3)
MONOCYTES ABSOLUTE: 0.8 THOU/MM3 (ref 0.4–1.3)
MONOCYTES ABSOLUTE: 0.9 THOU/MM3 (ref 0.4–1.3)
MONOCYTES ABSOLUTE: 1.3 THOU/MM3 (ref 0.4–1.3)
NITRITE, URINE: NEGATIVE
NITRITE, URINE: NEGATIVE
NUCLEATED RED BLOOD CELLS: 0 /100 WBC
ORGANISM: ABNORMAL
OSMOLALITY CALCULATION: 298.4 MOSMOL/KG (ref 275–300)
OSMOLALITY CALCULATION: 308.6 MOSMOL/KG (ref 275–300)
PH UA: 5.5 (ref 5–9)
PH UA: 6 (ref 5–9)
PLATELET # BLD: 215 THOU/MM3 (ref 130–400)
PLATELET # BLD: 221 THOU/MM3 (ref 130–400)
PLATELET # BLD: 223 THOU/MM3 (ref 130–400)
PLATELET # BLD: 223 THOU/MM3 (ref 130–400)
PLATELET # BLD: 291 THOU/MM3 (ref 130–400)
PLATELET # BLD: 296 THOU/MM3 (ref 130–400)
PLATELET # BLD: 333 THOU/MM3 (ref 130–400)
PLATELET ESTIMATE: ADEQUATE
PMV BLD AUTO: 10.2 FL (ref 9.4–12.4)
PMV BLD AUTO: 10.4 FL (ref 9.4–12.4)
PMV BLD AUTO: 11.1 FL (ref 9.4–12.4)
PMV BLD AUTO: 11.1 FL (ref 9.4–12.4)
PMV BLD AUTO: 11.2 FL (ref 9.4–12.4)
PMV BLD AUTO: 11.5 FL (ref 9.4–12.4)
PMV BLD AUTO: 9.9 FL (ref 9.4–12.4)
POTASSIUM REFLEX MAGNESIUM: 4.4 MEQ/L (ref 3.5–5.2)
POTASSIUM REFLEX MAGNESIUM: 4.5 MEQ/L (ref 3.5–5.2)
POTASSIUM REFLEX MAGNESIUM: 4.6 MEQ/L (ref 3.5–5.2)
POTASSIUM SERPL-SCNC: 3.3 MEQ/L (ref 3.5–5.2)
POTASSIUM SERPL-SCNC: 3.5 MEQ/L (ref 3.5–5.2)
POTASSIUM SERPL-SCNC: 3.6 MEQ/L (ref 3.5–5.2)
POTASSIUM SERPL-SCNC: 3.7 MEQ/L (ref 3.5–5.2)
POTASSIUM SERPL-SCNC: 3.9 MEQ/L (ref 3.5–5.2)
POTASSIUM SERPL-SCNC: 3.9 MEQ/L (ref 3.5–5.2)
POTASSIUM SERPL-SCNC: 4 MEQ/L (ref 3.5–5.2)
PRO-BNP: 8484 PG/ML (ref 0–1800)
PROTEIN UA: 100
PROTEIN UA: 100
PTH INTACT: 30 PG/ML (ref 15–65)
RBC # BLD: 3.16 MILL/MM3 (ref 4.2–5.4)
RBC # BLD: 3.16 MILL/MM3 (ref 4.2–5.4)
RBC # BLD: 3.24 MILL/MM3 (ref 4.2–5.4)
RBC # BLD: 3.37 MILL/MM3 (ref 4.2–5.4)
RBC # BLD: 3.45 MILL/MM3 (ref 4.2–5.4)
RBC # BLD: 3.71 MILL/MM3 (ref 4.2–5.4)
RBC # BLD: 4.29 MILL/MM3 (ref 4.2–5.4)
RBC URINE: ABNORMAL /HPF
RBC URINE: ABNORMAL /HPF
RENAL EPITHELIAL, UA: ABNORMAL
RENAL EPITHELIAL, UA: ABNORMAL
SCAN OF BLOOD SMEAR: NORMAL
SEG NEUTROPHILS: 80.3 %
SEG NEUTROPHILS: 85.2 %
SEG NEUTROPHILS: 87.8 %
SEG NEUTROPHILS: 91.2 %
SEGMENTED NEUTROPHILS ABSOLUTE COUNT: 10.4 THOU/MM3 (ref 1.8–7.7)
SEGMENTED NEUTROPHILS ABSOLUTE COUNT: 10.9 THOU/MM3 (ref 1.8–7.7)
SEGMENTED NEUTROPHILS ABSOLUTE COUNT: 12.6 THOU/MM3 (ref 1.8–7.7)
SEGMENTED NEUTROPHILS ABSOLUTE COUNT: 9.7 THOU/MM3 (ref 1.8–7.7)
SODIUM BLD-SCNC: 140 MEQ/L (ref 135–145)
SODIUM BLD-SCNC: 140 MEQ/L (ref 135–145)
SODIUM BLD-SCNC: 141 MEQ/L (ref 135–145)
SODIUM BLD-SCNC: 142 MEQ/L (ref 135–145)
SODIUM BLD-SCNC: 143 MEQ/L (ref 135–145)
SODIUM BLD-SCNC: 144 MEQ/L (ref 135–145)
SODIUM BLD-SCNC: 145 MEQ/L (ref 135–145)
SODIUM BLD-SCNC: 145 MEQ/L (ref 135–145)
SODIUM URINE: < 20 MEQ/L
SPECIFIC GRAVITY, URINE: 1.02 (ref 1–1.03)
SPECIFIC GRAVITY, URINE: > 1.03 (ref 1–1.03)
TOTAL CK: 14 U/L (ref 30–135)
TOTAL IRON BINDING CAPACITY: 141 UG/DL (ref 171–450)
TOTAL PROTEIN: 6.3 G/DL (ref 6.1–8)
TOTAL PROTEIN: 7.5 G/DL (ref 6.1–8)
TROPONIN T: 0.03 NG/ML
TROPONIN T: 0.03 NG/ML
TROPONIN T: 0.05 NG/ML
TROPONIN T: < 0.01 NG/ML
TSH SERPL DL<=0.05 MIU/L-ACNC: 10.92 UIU/ML
TSH SERPL DL<=0.05 MIU/L-ACNC: 11.82 UIU/ML
URINE CULTURE REFLEX: ABNORMAL
UROBILINOGEN, URINE: 0.2 EU/DL (ref 0–1)
UROBILINOGEN, URINE: 0.2 EU/DL (ref 0–1)
VITAMIN B-12: 606 PG/ML (ref 211–911)
VITAMIN D 25-HYDROXY: 82 NG/ML (ref 30–100)
WBC # BLD: 11.4 THOU/MM3 (ref 4.8–10.8)
WBC # BLD: 11.5 THOU/MM3 (ref 4.8–10.8)
WBC # BLD: 11.9 THOU/MM3 (ref 4.8–10.8)
WBC # BLD: 12.2 THOU/MM3 (ref 4.8–10.8)
WBC # BLD: 12.9 THOU/MM3 (ref 4.8–10.8)
WBC # BLD: 14.3 THOU/MM3 (ref 4.8–10.8)
WBC # BLD: 9.6 THOU/MM3 (ref 4.8–10.8)
WBC UA: ABNORMAL /HPF
WBC UA: ABNORMAL /HPF
YEAST: ABNORMAL
YEAST: ABNORMAL

## 2019-01-01 PROCEDURE — 6370000000 HC RX 637 (ALT 250 FOR IP): Performed by: NURSE PRACTITIONER

## 2019-01-01 PROCEDURE — 83540 ASSAY OF IRON: CPT

## 2019-01-01 PROCEDURE — 74177 CT ABD & PELVIS W/CONTRAST: CPT

## 2019-01-01 PROCEDURE — 2709999900 HC NON-CHARGEABLE SUPPLY

## 2019-01-01 PROCEDURE — 99233 SBSQ HOSP IP/OBS HIGH 50: CPT | Performed by: HOSPITALIST

## 2019-01-01 PROCEDURE — 6370000000 HC RX 637 (ALT 250 FOR IP): Performed by: HOSPITALIST

## 2019-01-01 PROCEDURE — G8420 CALC BMI NORM PARAMETERS: HCPCS | Performed by: FAMILY MEDICINE

## 2019-01-01 PROCEDURE — G8599 NO ASA/ANTIPLAT THER USE RNG: HCPCS | Performed by: FAMILY MEDICINE

## 2019-01-01 PROCEDURE — 99221 1ST HOSP IP/OBS SF/LOW 40: CPT | Performed by: INTERNAL MEDICINE

## 2019-01-01 PROCEDURE — 2580000003 HC RX 258: Performed by: PHYSICIAN ASSISTANT

## 2019-01-01 PROCEDURE — 2500000003 HC RX 250 WO HCPCS: Performed by: PHYSICIAN ASSISTANT

## 2019-01-01 PROCEDURE — 6370000000 HC RX 637 (ALT 250 FOR IP): Performed by: FAMILY MEDICINE

## 2019-01-01 PROCEDURE — 78306 BONE IMAGING WHOLE BODY: CPT

## 2019-01-01 PROCEDURE — 1123F ACP DISCUSS/DSCN MKR DOCD: CPT | Performed by: SURGERY

## 2019-01-01 PROCEDURE — 3430000000 HC RX DIAGNOSTIC RADIOPHARMACEUTICAL: Performed by: HOSPITALIST

## 2019-01-01 PROCEDURE — 4040F PNEUMOC VAC/ADMIN/RCVD: CPT | Performed by: FAMILY MEDICINE

## 2019-01-01 PROCEDURE — 82948 REAGENT STRIP/BLOOD GLUCOSE: CPT

## 2019-01-01 PROCEDURE — 80048 BASIC METABOLIC PNL TOTAL CA: CPT

## 2019-01-01 PROCEDURE — 1111F DSCHRG MED/CURRENT MED MERGE: CPT | Performed by: SURGERY

## 2019-01-01 PROCEDURE — 84160 ASSAY OF PROTEIN ANY SOURCE: CPT

## 2019-01-01 PROCEDURE — 1200000003 HC TELEMETRY R&B

## 2019-01-01 PROCEDURE — 82550 ASSAY OF CK (CPK): CPT

## 2019-01-01 PROCEDURE — 6360000002 HC RX W HCPCS: Performed by: PHYSICIAN ASSISTANT

## 2019-01-01 PROCEDURE — 1123F ACP DISCUSS/DSCN MKR DOCD: CPT | Performed by: FAMILY MEDICINE

## 2019-01-01 PROCEDURE — 97535 SELF CARE MNGMENT TRAINING: CPT

## 2019-01-01 PROCEDURE — 6370000000 HC RX 637 (ALT 250 FOR IP): Performed by: PHYSICIAN ASSISTANT

## 2019-01-01 PROCEDURE — G8427 DOCREV CUR MEDS BY ELIG CLIN: HCPCS | Performed by: FAMILY MEDICINE

## 2019-01-01 PROCEDURE — 83880 ASSAY OF NATRIURETIC PEPTIDE: CPT

## 2019-01-01 PROCEDURE — 99222 1ST HOSP IP/OBS MODERATE 55: CPT | Performed by: SURGERY

## 2019-01-01 PROCEDURE — 71045 X-RAY EXAM CHEST 1 VIEW: CPT

## 2019-01-01 PROCEDURE — 1101F PT FALLS ASSESS-DOCD LE1/YR: CPT | Performed by: FAMILY MEDICINE

## 2019-01-01 PROCEDURE — 76770 US EXAM ABDO BACK WALL COMP: CPT

## 2019-01-01 PROCEDURE — 99232 SBSQ HOSP IP/OBS MODERATE 35: CPT | Performed by: NURSE PRACTITIONER

## 2019-01-01 PROCEDURE — APPSS30 APP SPLIT SHARED TIME 16-30 MINUTES: Performed by: NURSE PRACTITIONER

## 2019-01-01 PROCEDURE — 99213 OFFICE O/P EST LOW 20 MIN: CPT | Performed by: FAMILY MEDICINE

## 2019-01-01 PROCEDURE — 1090F PRES/ABSN URINE INCON ASSESS: CPT | Performed by: FAMILY MEDICINE

## 2019-01-01 PROCEDURE — 36415 COLL VENOUS BLD VENIPUNCTURE: CPT

## 2019-01-01 PROCEDURE — 99223 1ST HOSP IP/OBS HIGH 75: CPT | Performed by: FAMILY MEDICINE

## 2019-01-01 PROCEDURE — 2580000003 HC RX 258: Performed by: NURSE PRACTITIONER

## 2019-01-01 PROCEDURE — 97166 OT EVAL MOD COMPLEX 45 MIN: CPT

## 2019-01-01 PROCEDURE — 99214 OFFICE O/P EST MOD 30 MIN: CPT | Performed by: FAMILY MEDICINE

## 2019-01-01 PROCEDURE — 6360000002 HC RX W HCPCS: Performed by: INTERNAL MEDICINE

## 2019-01-01 PROCEDURE — 94761 N-INVAS EAR/PLS OXIMETRY MLT: CPT

## 2019-01-01 PROCEDURE — 83970 ASSAY OF PARATHORMONE: CPT

## 2019-01-01 PROCEDURE — 85027 COMPLETE CBC AUTOMATED: CPT

## 2019-01-01 PROCEDURE — 1036F TOBACCO NON-USER: CPT | Performed by: FAMILY MEDICINE

## 2019-01-01 PROCEDURE — 82607 VITAMIN B-12: CPT

## 2019-01-01 PROCEDURE — 6360000004 HC RX CONTRAST MEDICATION: Performed by: PHYSICIAN ASSISTANT

## 2019-01-01 PROCEDURE — 99232 SBSQ HOSP IP/OBS MODERATE 35: CPT | Performed by: SURGERY

## 2019-01-01 PROCEDURE — 84165 PROTEIN E-PHORESIS SERUM: CPT

## 2019-01-01 PROCEDURE — 1111F DSCHRG MED/CURRENT MED MERGE: CPT | Performed by: FAMILY MEDICINE

## 2019-01-01 PROCEDURE — 99233 SBSQ HOSP IP/OBS HIGH 50: CPT | Performed by: INTERNAL MEDICINE

## 2019-01-01 PROCEDURE — 80053 COMPREHEN METABOLIC PANEL: CPT

## 2019-01-01 PROCEDURE — 81001 URINALYSIS AUTO W/SCOPE: CPT

## 2019-01-01 PROCEDURE — 93010 ELECTROCARDIOGRAM REPORT: CPT | Performed by: INTERNAL MEDICINE

## 2019-01-01 PROCEDURE — 82570 ASSAY OF URINE CREATININE: CPT

## 2019-01-01 PROCEDURE — 93005 ELECTROCARDIOGRAM TRACING: CPT | Performed by: NURSE PRACTITIONER

## 2019-01-01 PROCEDURE — 82746 ASSAY OF FOLIC ACID SERUM: CPT

## 2019-01-01 PROCEDURE — 99232 SBSQ HOSP IP/OBS MODERATE 35: CPT | Performed by: INTERNAL MEDICINE

## 2019-01-01 PROCEDURE — 2580000003 HC RX 258: Performed by: FAMILY MEDICINE

## 2019-01-01 PROCEDURE — 6360000002 HC RX W HCPCS: Performed by: FAMILY MEDICINE

## 2019-01-01 PROCEDURE — 93306 TTE W/DOPPLER COMPLETE: CPT

## 2019-01-01 PROCEDURE — 82306 VITAMIN D 25 HYDROXY: CPT

## 2019-01-01 PROCEDURE — 93005 ELECTROCARDIOGRAM TRACING: CPT | Performed by: INTERNAL MEDICINE

## 2019-01-01 PROCEDURE — G8484 FLU IMMUNIZE NO ADMIN: HCPCS | Performed by: FAMILY MEDICINE

## 2019-01-01 PROCEDURE — 84484 ASSAY OF TROPONIN QUANT: CPT

## 2019-01-01 PROCEDURE — 93005 ELECTROCARDIOGRAM TRACING: CPT | Performed by: FAMILY MEDICINE

## 2019-01-01 PROCEDURE — 1200000000 HC SEMI PRIVATE

## 2019-01-01 PROCEDURE — 99285 EMERGENCY DEPT VISIT HI MDM: CPT

## 2019-01-01 PROCEDURE — 85025 COMPLETE CBC W/AUTO DIFF WBC: CPT

## 2019-01-01 PROCEDURE — 82330 ASSAY OF CALCIUM: CPT

## 2019-01-01 PROCEDURE — G8599 NO ASA/ANTIPLAT THER USE RNG: HCPCS | Performed by: SURGERY

## 2019-01-01 PROCEDURE — 3430000000 HC RX DIAGNOSTIC RADIOPHARMACEUTICAL: Performed by: PHYSICIAN ASSISTANT

## 2019-01-01 PROCEDURE — 97163 PT EVAL HIGH COMPLEX 45 MIN: CPT

## 2019-01-01 PROCEDURE — 2580000003 HC RX 258: Performed by: HOSPITALIST

## 2019-01-01 PROCEDURE — 99213 OFFICE O/P EST LOW 20 MIN: CPT | Performed by: SURGERY

## 2019-01-01 PROCEDURE — 96360 HYDRATION IV INFUSION INIT: CPT

## 2019-01-01 PROCEDURE — 93005 ELECTROCARDIOGRAM TRACING: CPT | Performed by: EMERGENCY MEDICINE

## 2019-01-01 PROCEDURE — G8427 DOCREV CUR MEDS BY ELIG CLIN: HCPCS | Performed by: SURGERY

## 2019-01-01 PROCEDURE — A9537 TC99M MEBROFENIN: HCPCS | Performed by: PHYSICIAN ASSISTANT

## 2019-01-01 PROCEDURE — 82728 ASSAY OF FERRITIN: CPT

## 2019-01-01 PROCEDURE — 4040F PNEUMOC VAC/ADMIN/RCVD: CPT | Performed by: SURGERY

## 2019-01-01 PROCEDURE — 83550 IRON BINDING TEST: CPT

## 2019-01-01 PROCEDURE — 1036F TOBACCO NON-USER: CPT | Performed by: SURGERY

## 2019-01-01 PROCEDURE — 78227 HEPATOBIL SYST IMAGE W/DRUG: CPT

## 2019-01-01 PROCEDURE — 1090F PRES/ABSN URINE INCON ASSESS: CPT | Performed by: SURGERY

## 2019-01-01 PROCEDURE — 99495 TRANSJ CARE MGMT MOD F2F 14D: CPT | Performed by: FAMILY MEDICINE

## 2019-01-01 PROCEDURE — 99239 HOSP IP/OBS DSCHRG MGMT >30: CPT | Performed by: NURSE PRACTITIONER

## 2019-01-01 PROCEDURE — APPSS180 APP SPLIT SHARED TIME > 60 MINUTES: Performed by: PHYSICIAN ASSISTANT

## 2019-01-01 PROCEDURE — A9503 TC99M MEDRONATE: HCPCS | Performed by: HOSPITALIST

## 2019-01-01 PROCEDURE — 2580000003 HC RX 258: Performed by: EMERGENCY MEDICINE

## 2019-01-01 PROCEDURE — 6360000004 HC RX CONTRAST MEDICATION: Performed by: NURSE PRACTITIONER

## 2019-01-01 PROCEDURE — 87804 INFLUENZA ASSAY W/OPTIC: CPT

## 2019-01-01 PROCEDURE — 99232 SBSQ HOSP IP/OBS MODERATE 35: CPT | Performed by: FAMILY MEDICINE

## 2019-01-01 PROCEDURE — 6370000000 HC RX 637 (ALT 250 FOR IP): Performed by: INTERNAL MEDICINE

## 2019-01-01 PROCEDURE — 97530 THERAPEUTIC ACTIVITIES: CPT

## 2019-01-01 PROCEDURE — G8420 CALC BMI NORM PARAMETERS: HCPCS | Performed by: SURGERY

## 2019-01-01 PROCEDURE — 84300 ASSAY OF URINE SODIUM: CPT

## 2019-01-01 PROCEDURE — 2700000000 HC OXYGEN THERAPY PER DAY

## 2019-01-01 PROCEDURE — 87086 URINE CULTURE/COLONY COUNT: CPT

## 2019-01-01 PROCEDURE — 70551 MRI BRAIN STEM W/O DYE: CPT

## 2019-01-01 RX ORDER — HALOPERIDOL 5 MG/ML
2 INJECTION INTRAMUSCULAR ONCE
Status: COMPLETED | OUTPATIENT
Start: 2019-01-01 | End: 2019-01-01

## 2019-01-01 RX ORDER — ALPRAZOLAM 0.25 MG/1
0.25 TABLET ORAL NIGHTLY
Status: DISCONTINUED | OUTPATIENT
Start: 2019-01-01 | End: 2019-10-26 | Stop reason: HOSPADM

## 2019-01-01 RX ORDER — CALCIUM CARBONATE 500(1250)
500 TABLET ORAL DAILY
Status: DISCONTINUED | OUTPATIENT
Start: 2019-01-01 | End: 2019-01-01 | Stop reason: HOSPADM

## 2019-01-01 RX ORDER — TC 99M MEDRONATE 20 MG/10ML
26.5 INJECTION, POWDER, LYOPHILIZED, FOR SOLUTION INTRAVENOUS
Status: COMPLETED | OUTPATIENT
Start: 2019-01-01 | End: 2019-01-01

## 2019-01-01 RX ORDER — DONEPEZIL HYDROCHLORIDE 10 MG/1
10 TABLET, FILM COATED ORAL NIGHTLY
Status: DISCONTINUED | OUTPATIENT
Start: 2019-01-01 | End: 2019-10-26 | Stop reason: HOSPADM

## 2019-01-01 RX ORDER — ONDANSETRON 2 MG/ML
4 INJECTION INTRAMUSCULAR; INTRAVENOUS EVERY 6 HOURS PRN
Status: DISCONTINUED | OUTPATIENT
Start: 2019-01-01 | End: 2019-10-26 | Stop reason: HOSPADM

## 2019-01-01 RX ORDER — HEPARIN SODIUM 5000 [USP'U]/ML
5000 INJECTION, SOLUTION INTRAVENOUS; SUBCUTANEOUS EVERY 8 HOURS SCHEDULED
Status: DISCONTINUED | OUTPATIENT
Start: 2019-01-01 | End: 2019-10-26 | Stop reason: HOSPADM

## 2019-01-01 RX ORDER — METOPROLOL TARTRATE 50 MG/1
50 TABLET, FILM COATED ORAL DAILY
Refills: 4 | COMMUNITY
Start: 2019-01-01

## 2019-01-01 RX ORDER — LISINOPRIL 20 MG/1
20 TABLET ORAL DAILY
Status: DISCONTINUED | OUTPATIENT
Start: 2019-01-01 | End: 2019-01-01 | Stop reason: HOSPADM

## 2019-01-01 RX ORDER — ACETAMINOPHEN 325 MG/1
650 TABLET ORAL EVERY 4 HOURS PRN
Status: DISCONTINUED | OUTPATIENT
Start: 2019-01-01 | End: 2019-01-01 | Stop reason: HOSPADM

## 2019-01-01 RX ORDER — SODIUM CHLORIDE 0.9 % (FLUSH) 0.9 %
10 SYRINGE (ML) INJECTION EVERY 12 HOURS SCHEDULED
Status: DISCONTINUED | OUTPATIENT
Start: 2019-01-01 | End: 2019-01-01 | Stop reason: HOSPADM

## 2019-01-01 RX ORDER — DEXTROSE MONOHYDRATE 50 MG/ML
100 INJECTION, SOLUTION INTRAVENOUS PRN
Status: DISCONTINUED | OUTPATIENT
Start: 2019-01-01 | End: 2019-01-01 | Stop reason: HOSPADM

## 2019-01-01 RX ORDER — 0.9 % SODIUM CHLORIDE 0.9 %
500 INTRAVENOUS SOLUTION INTRAVENOUS ONCE
Status: COMPLETED | OUTPATIENT
Start: 2019-01-01 | End: 2019-01-01

## 2019-01-01 RX ORDER — ACETAMINOPHEN 325 MG/1
650 TABLET ORAL EVERY 4 HOURS PRN
Status: DISCONTINUED | OUTPATIENT
Start: 2019-01-01 | End: 2019-10-26 | Stop reason: HOSPADM

## 2019-01-01 RX ORDER — NICOTINE POLACRILEX 4 MG
15 LOZENGE BUCCAL PRN
Status: DISCONTINUED | OUTPATIENT
Start: 2019-01-01 | End: 2019-01-01 | Stop reason: HOSPADM

## 2019-01-01 RX ORDER — SODIUM CHLORIDE 450 MG/100ML
INJECTION, SOLUTION INTRAVENOUS CONTINUOUS
Status: DISCONTINUED | OUTPATIENT
Start: 2019-01-01 | End: 2019-10-26 | Stop reason: HOSPADM

## 2019-01-01 RX ORDER — HALOPERIDOL 5 MG/ML
5 INJECTION INTRAMUSCULAR ONCE
Status: COMPLETED | OUTPATIENT
Start: 2019-01-01 | End: 2019-01-01

## 2019-01-01 RX ORDER — LEVOTHYROXINE SODIUM 88 UG/1
88 TABLET ORAL DAILY
Status: DISCONTINUED | OUTPATIENT
Start: 2019-01-01 | End: 2019-01-01 | Stop reason: HOSPADM

## 2019-01-01 RX ORDER — LEVOTHYROXINE SODIUM 88 UG/1
88 TABLET ORAL DAILY
Status: DISCONTINUED | OUTPATIENT
Start: 2019-01-01 | End: 2019-10-26 | Stop reason: HOSPADM

## 2019-01-01 RX ORDER — AMLODIPINE BESYLATE 10 MG/1
10 TABLET ORAL DAILY
Status: DISCONTINUED | OUTPATIENT
Start: 2019-01-01 | End: 2019-10-26 | Stop reason: HOSPADM

## 2019-01-01 RX ORDER — DONEPEZIL HYDROCHLORIDE 5 MG/1
5 TABLET, FILM COATED ORAL NIGHTLY
Qty: 30 TABLET | Refills: 0 | Status: SHIPPED | OUTPATIENT
Start: 2019-01-01 | End: 2019-01-01 | Stop reason: SDUPTHER

## 2019-01-01 RX ORDER — HALOPERIDOL 5 MG/ML
10 INJECTION INTRAMUSCULAR
Status: DISCONTINUED | OUTPATIENT
Start: 2019-01-01 | End: 2019-01-01

## 2019-01-01 RX ORDER — AMLODIPINE BESYLATE 10 MG/1
10 TABLET ORAL DAILY
Qty: 30 TABLET | Refills: 3 | Status: SHIPPED | OUTPATIENT
Start: 2019-01-01 | End: 2019-01-01 | Stop reason: SDUPTHER

## 2019-01-01 RX ORDER — SODIUM CHLORIDE 9 MG/ML
INJECTION, SOLUTION INTRAVENOUS CONTINUOUS
Status: DISCONTINUED | OUTPATIENT
Start: 2019-01-01 | End: 2019-01-01

## 2019-01-01 RX ORDER — FERROUS SULFATE 325(65) MG
650 TABLET ORAL DAILY
Status: DISCONTINUED | OUTPATIENT
Start: 2019-01-01 | End: 2019-01-01 | Stop reason: HOSPADM

## 2019-01-01 RX ORDER — RANITIDINE 150 MG/1
TABLET ORAL
Qty: 180 TABLET | Refills: 3 | Status: SHIPPED | OUTPATIENT
Start: 2019-01-01

## 2019-01-01 RX ORDER — HALOPERIDOL 5 MG/ML
2 INJECTION INTRAMUSCULAR
Status: DISPENSED | OUTPATIENT
Start: 2019-01-01 | End: 2019-01-01

## 2019-01-01 RX ORDER — LEVOTHYROXINE SODIUM 88 UG/1
88 TABLET ORAL DAILY
Qty: 90 TABLET | Refills: 11 | OUTPATIENT
Start: 2019-01-01

## 2019-01-01 RX ORDER — FLUOCINONIDE 0.5 MG/G
OINTMENT TOPICAL
Qty: 30 G | Refills: 1 | Status: SHIPPED | OUTPATIENT
Start: 2019-01-01 | End: 2019-01-01

## 2019-01-01 RX ORDER — 0.9 % SODIUM CHLORIDE 0.9 %
1000 INTRAVENOUS SOLUTION INTRAVENOUS ONCE
Status: COMPLETED | OUTPATIENT
Start: 2019-01-01 | End: 2019-01-01

## 2019-01-01 RX ORDER — LEVOTHYROXINE SODIUM 88 UG/1
88 TABLET ORAL DAILY
Qty: 30 TABLET | Refills: 11 | Status: SHIPPED | OUTPATIENT
Start: 2019-01-01

## 2019-01-01 RX ORDER — DONEPEZIL HYDROCHLORIDE 10 MG/1
10 TABLET, FILM COATED ORAL NIGHTLY
Qty: 90 TABLET | Refills: 3 | Status: SHIPPED | OUTPATIENT
Start: 2019-01-01

## 2019-01-01 RX ORDER — AMLODIPINE BESYLATE 10 MG/1
10 TABLET ORAL DAILY
Status: DISCONTINUED | OUTPATIENT
Start: 2019-01-01 | End: 2019-01-01 | Stop reason: HOSPADM

## 2019-01-01 RX ORDER — ALPRAZOLAM 0.25 MG/1
0.25 TABLET ORAL NIGHTLY PRN
COMMUNITY
End: 2019-01-01 | Stop reason: SDUPTHER

## 2019-01-01 RX ORDER — METOPROLOL TARTRATE 50 MG/1
50 TABLET, FILM COATED ORAL DAILY
Status: DISCONTINUED | OUTPATIENT
Start: 2019-01-01 | End: 2019-01-01

## 2019-01-01 RX ORDER — DOCUSATE SODIUM 100 MG/1
100 CAPSULE, LIQUID FILLED ORAL NIGHTLY
Status: DISCONTINUED | OUTPATIENT
Start: 2019-01-01 | End: 2019-01-01 | Stop reason: HOSPADM

## 2019-01-01 RX ORDER — SODIUM CHLORIDE 0.9 % (FLUSH) 0.9 %
10 SYRINGE (ML) INJECTION EVERY 12 HOURS SCHEDULED
Status: DISCONTINUED | OUTPATIENT
Start: 2019-01-01 | End: 2019-10-26 | Stop reason: HOSPADM

## 2019-01-01 RX ORDER — DONEPEZIL HYDROCHLORIDE 5 MG/1
5 TABLET, FILM COATED ORAL NIGHTLY
Qty: 30 TABLET | Refills: 11 | Status: SHIPPED | OUTPATIENT
Start: 2019-01-01 | End: 2019-01-01

## 2019-01-01 RX ORDER — AMLODIPINE BESYLATE 10 MG/1
10 TABLET ORAL DAILY
Qty: 90 TABLET | Refills: 3 | Status: SHIPPED | OUTPATIENT
Start: 2019-01-01

## 2019-01-01 RX ORDER — DOCUSATE SODIUM 100 MG/1
100 CAPSULE, LIQUID FILLED ORAL 2 TIMES DAILY
Status: DISCONTINUED | OUTPATIENT
Start: 2019-01-01 | End: 2019-10-26 | Stop reason: HOSPADM

## 2019-01-01 RX ORDER — HYDRALAZINE HYDROCHLORIDE 20 MG/ML
4 INJECTION INTRAMUSCULAR; INTRAVENOUS ONCE
Status: COMPLETED | OUTPATIENT
Start: 2019-01-01 | End: 2019-01-01

## 2019-01-01 RX ORDER — PNV NO.95/FERROUS FUM/FOLIC AC 28MG-0.8MG
2 TABLET ORAL DAILY
Status: DISCONTINUED | OUTPATIENT
Start: 2019-01-01 | End: 2019-01-01

## 2019-01-01 RX ORDER — ALPRAZOLAM 0.25 MG/1
0.25 TABLET ORAL NIGHTLY PRN
Qty: 30 TABLET | Refills: 2 | Status: SHIPPED | OUTPATIENT
Start: 2019-01-01 | End: 2019-01-01 | Stop reason: SDUPTHER

## 2019-01-01 RX ORDER — SODIUM CHLORIDE 0.9 % (FLUSH) 0.9 %
10 SYRINGE (ML) INJECTION PRN
Status: DISCONTINUED | OUTPATIENT
Start: 2019-01-01 | End: 2019-10-26 | Stop reason: HOSPADM

## 2019-01-01 RX ORDER — RANITIDINE 150 MG/1
150 TABLET ORAL 2 TIMES DAILY
Qty: 60 TABLET | Refills: 11 | Status: SHIPPED | OUTPATIENT
Start: 2019-01-01 | End: 2019-01-01 | Stop reason: SDUPTHER

## 2019-01-01 RX ORDER — DONEPEZIL HYDROCHLORIDE 5 MG/1
5 TABLET, FILM COATED ORAL NIGHTLY
Status: DISCONTINUED | OUTPATIENT
Start: 2019-01-01 | End: 2019-01-01 | Stop reason: HOSPADM

## 2019-01-01 RX ORDER — ONDANSETRON 2 MG/ML
4 INJECTION INTRAMUSCULAR; INTRAVENOUS EVERY 6 HOURS PRN
Status: DISCONTINUED | OUTPATIENT
Start: 2019-01-01 | End: 2019-01-01 | Stop reason: HOSPADM

## 2019-01-01 RX ORDER — HYDRALAZINE HYDROCHLORIDE 25 MG/1
25 TABLET, FILM COATED ORAL EVERY 6 HOURS PRN
Status: DISCONTINUED | OUTPATIENT
Start: 2019-01-01 | End: 2019-01-01 | Stop reason: HOSPADM

## 2019-01-01 RX ORDER — ALPRAZOLAM 0.25 MG/1
TABLET ORAL
Qty: 30 TABLET | Refills: 0 | Status: SHIPPED | OUTPATIENT
Start: 2019-01-01 | End: 2019-01-01 | Stop reason: SDUPTHER

## 2019-01-01 RX ORDER — METOPROLOL TARTRATE 50 MG/1
50 TABLET, FILM COATED ORAL 2 TIMES DAILY
Status: DISCONTINUED | OUTPATIENT
Start: 2019-01-01 | End: 2019-01-01 | Stop reason: HOSPADM

## 2019-01-01 RX ORDER — METOPROLOL TARTRATE 50 MG/1
50 TABLET, FILM COATED ORAL 2 TIMES DAILY
Status: DISCONTINUED | OUTPATIENT
Start: 2019-01-01 | End: 2019-10-26 | Stop reason: HOSPADM

## 2019-01-01 RX ORDER — SODIUM CHLORIDE 0.9 % (FLUSH) 0.9 %
10 SYRINGE (ML) INJECTION PRN
Status: DISCONTINUED | OUTPATIENT
Start: 2019-01-01 | End: 2019-01-01 | Stop reason: HOSPADM

## 2019-01-01 RX ORDER — HALOPERIDOL 5 MG/ML
5 INJECTION INTRAMUSCULAR ONCE
Status: DISCONTINUED | OUTPATIENT
Start: 2019-01-01 | End: 2019-01-01

## 2019-01-01 RX ORDER — DEXTROSE MONOHYDRATE 25 G/50ML
12.5 INJECTION, SOLUTION INTRAVENOUS PRN
Status: DISCONTINUED | OUTPATIENT
Start: 2019-01-01 | End: 2019-01-01 | Stop reason: HOSPADM

## 2019-01-01 RX ADMIN — LEVOTHYROXINE SODIUM 88 MCG: 88 TABLET ORAL at 09:00

## 2019-01-01 RX ADMIN — INSULIN LISPRO 1 UNITS: 100 INJECTION, SOLUTION INTRAVENOUS; SUBCUTANEOUS at 20:38

## 2019-01-01 RX ADMIN — PIPERACILLIN SODIUM,TAZOBACTAM SODIUM 3.38 G: 3; .375 INJECTION, POWDER, FOR SOLUTION INTRAVENOUS at 12:18

## 2019-01-01 RX ADMIN — PIPERACILLIN SODIUM,TAZOBACTAM SODIUM 3.38 G: 3; .375 INJECTION, POWDER, FOR SOLUTION INTRAVENOUS at 23:52

## 2019-01-01 RX ADMIN — INSULIN LISPRO 1 UNITS: 100 INJECTION, SOLUTION INTRAVENOUS; SUBCUTANEOUS at 09:25

## 2019-01-01 RX ADMIN — Medication 10 ML: at 08:24

## 2019-01-01 RX ADMIN — DONEPEZIL HYDROCHLORIDE 5 MG: 5 TABLET, FILM COATED ORAL at 20:29

## 2019-01-01 RX ADMIN — VITAMIN D, TAB 1000IU (100/BT) 2000 UNITS: 25 TAB at 09:08

## 2019-01-01 RX ADMIN — Medication 10 ML: at 20:38

## 2019-01-01 RX ADMIN — HEPARIN SODIUM 5000 UNITS: 5000 INJECTION INTRAVENOUS; SUBCUTANEOUS at 14:54

## 2019-01-01 RX ADMIN — DONEPEZIL HYDROCHLORIDE 10 MG: 10 TABLET, FILM COATED ORAL at 20:07

## 2019-01-01 RX ADMIN — Medication 10 ML: at 09:01

## 2019-01-01 RX ADMIN — DOCUSATE SODIUM 100 MG: 100 CAPSULE, LIQUID FILLED ORAL at 08:21

## 2019-01-01 RX ADMIN — PIPERACILLIN SODIUM,TAZOBACTAM SODIUM 3.38 G: 3; .375 INJECTION, POWDER, FOR SOLUTION INTRAVENOUS at 20:48

## 2019-01-01 RX ADMIN — PIPERACILLIN SODIUM,TAZOBACTAM SODIUM 3.38 G: 3; .375 INJECTION, POWDER, FOR SOLUTION INTRAVENOUS at 12:32

## 2019-01-01 RX ADMIN — Medication 10 ML: at 09:14

## 2019-01-01 RX ADMIN — Medication 9.7 MILLICURIE: at 08:27

## 2019-01-01 RX ADMIN — DOCUSATE SODIUM 100 MG: 100 CAPSULE, LIQUID FILLED ORAL at 20:38

## 2019-01-01 RX ADMIN — METOPROLOL TARTRATE 50 MG: 50 TABLET, FILM COATED ORAL at 20:28

## 2019-01-01 RX ADMIN — IOPAMIDOL 80 ML: 755 INJECTION, SOLUTION INTRAVENOUS at 18:43

## 2019-01-01 RX ADMIN — PIPERACILLIN SODIUM,TAZOBACTAM SODIUM 3.38 G: 3; .375 INJECTION, POWDER, FOR SOLUTION INTRAVENOUS at 01:31

## 2019-01-01 RX ADMIN — CALCIUM 500 MG: 500 TABLET ORAL at 09:00

## 2019-01-01 RX ADMIN — LEVOTHYROXINE SODIUM 88 MCG: 88 TABLET ORAL at 09:08

## 2019-01-01 RX ADMIN — SODIUM CHLORIDE 1.05 MCG: 9 INJECTION, SOLUTION INTRAVENOUS at 11:05

## 2019-01-01 RX ADMIN — HYDRALAZINE HYDROCHLORIDE 4 MG: 20 INJECTION INTRAMUSCULAR; INTRAVENOUS at 04:59

## 2019-01-01 RX ADMIN — METOPROLOL TARTRATE 50 MG: 50 TABLET, FILM COATED ORAL at 04:44

## 2019-01-01 RX ADMIN — Medication 10 ML: at 08:55

## 2019-01-01 RX ADMIN — Medication 10 ML: at 12:26

## 2019-01-01 RX ADMIN — FERROUS SULFATE TAB 325 MG (65 MG ELEMENTAL FE) 650 MG: 325 (65 FE) TAB at 08:23

## 2019-01-01 RX ADMIN — INSULIN LISPRO 1 UNITS: 100 INJECTION, SOLUTION INTRAVENOUS; SUBCUTANEOUS at 20:32

## 2019-01-01 RX ADMIN — TC 99M MEDRONATE 26.5 MILLICURIE: 20 INJECTION, POWDER, LYOPHILIZED, FOR SOLUTION INTRAVENOUS at 07:30

## 2019-01-01 RX ADMIN — DOCUSATE SODIUM 100 MG: 100 CAPSULE, LIQUID FILLED ORAL at 20:07

## 2019-01-01 RX ADMIN — LISINOPRIL 20 MG: 20 TABLET ORAL at 12:23

## 2019-01-01 RX ADMIN — DOCUSATE SODIUM 100 MG: 100 CAPSULE, LIQUID FILLED ORAL at 20:37

## 2019-01-01 RX ADMIN — FERROUS SULFATE TAB 325 MG (65 MG ELEMENTAL FE) 650 MG: 325 (65 FE) TAB at 12:23

## 2019-01-01 RX ADMIN — LEVOTHYROXINE SODIUM 88 MCG: 88 TABLET ORAL at 08:48

## 2019-01-01 RX ADMIN — FAMOTIDINE 20 MG: 10 INJECTION, SOLUTION INTRAVENOUS at 09:03

## 2019-01-01 RX ADMIN — CALCIUM 500 MG: 500 TABLET ORAL at 09:09

## 2019-01-01 RX ADMIN — SODIUM CHLORIDE: 9 INJECTION, SOLUTION INTRAVENOUS at 16:37

## 2019-01-01 RX ADMIN — SODIUM CHLORIDE: 9 INJECTION, SOLUTION INTRAVENOUS at 22:51

## 2019-01-01 RX ADMIN — CALCIUM 500 MG: 500 TABLET ORAL at 08:23

## 2019-01-01 RX ADMIN — FERROUS SULFATE TAB 325 MG (65 MG ELEMENTAL FE) 650 MG: 325 (65 FE) TAB at 08:48

## 2019-01-01 RX ADMIN — SODIUM CHLORIDE 500 ML: 9 INJECTION, SOLUTION INTRAVENOUS at 12:36

## 2019-01-01 RX ADMIN — VITAMIN D, TAB 1000IU (100/BT) 2000 UNITS: 25 TAB at 09:00

## 2019-01-01 RX ADMIN — Medication 10 ML: at 22:50

## 2019-01-01 RX ADMIN — HEPARIN SODIUM 5000 UNITS: 5000 INJECTION INTRAVENOUS; SUBCUTANEOUS at 05:26

## 2019-01-01 RX ADMIN — SODIUM CHLORIDE 500 ML: 9 INJECTION, SOLUTION INTRAVENOUS at 11:10

## 2019-01-01 RX ADMIN — METOPROLOL TARTRATE 50 MG: 50 TABLET, FILM COATED ORAL at 08:23

## 2019-01-01 RX ADMIN — SODIUM CHLORIDE: 4.5 INJECTION, SOLUTION INTRAVENOUS at 08:56

## 2019-01-01 RX ADMIN — LISINOPRIL 20 MG: 20 TABLET ORAL at 09:00

## 2019-01-01 RX ADMIN — METOPROLOL TARTRATE 50 MG: 50 TABLET, FILM COATED ORAL at 20:07

## 2019-01-01 RX ADMIN — LISINOPRIL 20 MG: 20 TABLET ORAL at 08:24

## 2019-01-01 RX ADMIN — DONEPEZIL HYDROCHLORIDE 5 MG: 5 TABLET, FILM COATED ORAL at 20:52

## 2019-01-01 RX ADMIN — PIPERACILLIN SODIUM,TAZOBACTAM SODIUM 3.38 G: 3; .375 INJECTION, POWDER, FOR SOLUTION INTRAVENOUS at 23:57

## 2019-01-01 RX ADMIN — LEVOTHYROXINE SODIUM 88 MCG: 88 TABLET ORAL at 12:23

## 2019-01-01 RX ADMIN — METOPROLOL TARTRATE 50 MG: 50 TABLET, FILM COATED ORAL at 20:38

## 2019-01-01 RX ADMIN — LEVOTHYROXINE SODIUM 88 MCG: 88 TABLET ORAL at 08:24

## 2019-01-01 RX ADMIN — DOCUSATE SODIUM 100 MG: 100 CAPSULE, LIQUID FILLED ORAL at 22:16

## 2019-01-01 RX ADMIN — SODIUM CHLORIDE: 9 INJECTION, SOLUTION INTRAVENOUS at 18:58

## 2019-01-01 RX ADMIN — HEPARIN SODIUM 5000 UNITS: 5000 INJECTION INTRAVENOUS; SUBCUTANEOUS at 20:49

## 2019-01-01 RX ADMIN — DONEPEZIL HYDROCHLORIDE 5 MG: 5 TABLET, FILM COATED ORAL at 20:37

## 2019-01-01 RX ADMIN — ONDANSETRON 4 MG: 2 INJECTION INTRAMUSCULAR; INTRAVENOUS at 01:48

## 2019-01-01 RX ADMIN — CALCIUM 500 MG: 500 TABLET ORAL at 08:48

## 2019-01-01 RX ADMIN — FAMOTIDINE 20 MG: 10 INJECTION, SOLUTION INTRAVENOUS at 12:24

## 2019-01-01 RX ADMIN — ALPRAZOLAM 0.25 MG: 0.25 TABLET ORAL at 20:49

## 2019-01-01 RX ADMIN — FAMOTIDINE 20 MG: 10 INJECTION, SOLUTION INTRAVENOUS at 08:24

## 2019-01-01 RX ADMIN — METOPROLOL TARTRATE 50 MG: 50 TABLET, FILM COATED ORAL at 08:41

## 2019-01-01 RX ADMIN — HEPARIN SODIUM 5000 UNITS: 5000 INJECTION INTRAVENOUS; SUBCUTANEOUS at 14:20

## 2019-01-01 RX ADMIN — LISINOPRIL 20 MG: 20 TABLET ORAL at 09:08

## 2019-01-01 RX ADMIN — ALPRAZOLAM 0.25 MG: 0.25 TABLET ORAL at 22:16

## 2019-01-01 RX ADMIN — AMLODIPINE BESYLATE 10 MG: 10 TABLET ORAL at 13:00

## 2019-01-01 RX ADMIN — DONEPEZIL HYDROCHLORIDE 5 MG: 5 TABLET, FILM COATED ORAL at 22:51

## 2019-01-01 RX ADMIN — Medication 10 ML: at 20:29

## 2019-01-01 RX ADMIN — DOCUSATE SODIUM 100 MG: 100 CAPSULE, LIQUID FILLED ORAL at 20:28

## 2019-01-01 RX ADMIN — SODIUM CHLORIDE 1000 ML: 9 INJECTION, SOLUTION INTRAVENOUS at 17:00

## 2019-01-01 RX ADMIN — VITAMIN D, TAB 1000IU (100/BT) 2000 UNITS: 25 TAB at 08:23

## 2019-01-01 RX ADMIN — HYDRALAZINE HYDROCHLORIDE 25 MG: 25 TABLET, FILM COATED ORAL at 20:28

## 2019-01-01 RX ADMIN — HYDRALAZINE HYDROCHLORIDE 25 MG: 25 TABLET, FILM COATED ORAL at 16:38

## 2019-01-01 RX ADMIN — HEPARIN SODIUM 5000 UNITS: 5000 INJECTION INTRAVENOUS; SUBCUTANEOUS at 06:55

## 2019-01-01 RX ADMIN — DOCUSATE SODIUM 100 MG: 100 CAPSULE, LIQUID FILLED ORAL at 08:41

## 2019-01-01 RX ADMIN — FERROUS SULFATE TAB 325 MG (65 MG ELEMENTAL FE) 650 MG: 325 (65 FE) TAB at 09:09

## 2019-01-01 RX ADMIN — HALOPERIDOL LACTATE 2 MG: 5 INJECTION INTRAMUSCULAR at 22:23

## 2019-01-01 RX ADMIN — HYDRALAZINE HYDROCHLORIDE 25 MG: 25 TABLET, FILM COATED ORAL at 08:23

## 2019-01-01 RX ADMIN — FAMOTIDINE 20 MG: 10 INJECTION, SOLUTION INTRAVENOUS at 09:12

## 2019-01-01 RX ADMIN — METOPROLOL TARTRATE 50 MG: 50 TABLET, FILM COATED ORAL at 08:48

## 2019-01-01 RX ADMIN — METOPROLOL TARTRATE 50 MG: 50 TABLET, FILM COATED ORAL at 22:51

## 2019-01-01 RX ADMIN — SODIUM CHLORIDE: 9 INJECTION, SOLUTION INTRAVENOUS at 08:42

## 2019-01-01 RX ADMIN — HYDRALAZINE HYDROCHLORIDE 25 MG: 25 TABLET, FILM COATED ORAL at 20:37

## 2019-01-01 RX ADMIN — DOCUSATE SODIUM 100 MG: 100 CAPSULE, LIQUID FILLED ORAL at 20:54

## 2019-01-01 RX ADMIN — PIPERACILLIN SODIUM,TAZOBACTAM SODIUM 3.38 G: 3; .375 INJECTION, POWDER, FOR SOLUTION INTRAVENOUS at 05:53

## 2019-01-01 RX ADMIN — HEPARIN SODIUM 5000 UNITS: 5000 INJECTION INTRAVENOUS; SUBCUTANEOUS at 22:14

## 2019-01-01 RX ADMIN — FAMOTIDINE 20 MG: 10 INJECTION, SOLUTION INTRAVENOUS at 08:50

## 2019-01-01 RX ADMIN — DONEPEZIL HYDROCHLORIDE 5 MG: 5 TABLET, FILM COATED ORAL at 20:38

## 2019-01-01 RX ADMIN — HALOPERIDOL LACTATE 5 MG: 5 INJECTION INTRAMUSCULAR at 04:28

## 2019-01-01 RX ADMIN — DONEPEZIL HYDROCHLORIDE 10 MG: 10 TABLET, FILM COATED ORAL at 20:49

## 2019-01-01 RX ADMIN — DONEPEZIL HYDROCHLORIDE 10 MG: 10 TABLET, FILM COATED ORAL at 22:14

## 2019-01-01 RX ADMIN — LEVOTHYROXINE SODIUM 88 MCG: 88 TABLET ORAL at 06:55

## 2019-01-01 RX ADMIN — METOPROLOL TARTRATE 50 MG: 50 TABLET, FILM COATED ORAL at 08:20

## 2019-01-01 RX ADMIN — INSULIN LISPRO 2 UNITS: 100 INJECTION, SOLUTION INTRAVENOUS; SUBCUTANEOUS at 12:58

## 2019-01-01 RX ADMIN — METOPROLOL TARTRATE 50 MG: 50 TABLET, FILM COATED ORAL at 09:08

## 2019-01-01 RX ADMIN — METOPROLOL TARTRATE 50 MG: 50 TABLET, FILM COATED ORAL at 20:37

## 2019-01-01 RX ADMIN — METOPROLOL TARTRATE 50 MG: 50 TABLET, FILM COATED ORAL at 20:53

## 2019-01-01 RX ADMIN — FERROUS SULFATE TAB 325 MG (65 MG ELEMENTAL FE) 650 MG: 325 (65 FE) TAB at 09:00

## 2019-01-01 RX ADMIN — AMLODIPINE BESYLATE 10 MG: 10 TABLET ORAL at 14:20

## 2019-01-01 RX ADMIN — METOPROLOL TARTRATE 50 MG: 50 TABLET, FILM COATED ORAL at 09:00

## 2019-01-01 RX ADMIN — LEVOTHYROXINE SODIUM 88 MCG: 88 TABLET ORAL at 05:26

## 2019-01-01 RX ADMIN — ACETAMINOPHEN 650 MG: 325 TABLET ORAL at 20:49

## 2019-01-01 RX ADMIN — AMLODIPINE BESYLATE 10 MG: 10 TABLET ORAL at 08:20

## 2019-01-01 RX ADMIN — LISINOPRIL 20 MG: 20 TABLET ORAL at 08:48

## 2019-01-01 RX ADMIN — INSULIN LISPRO 1 UNITS: 100 INJECTION, SOLUTION INTRAVENOUS; SUBCUTANEOUS at 12:52

## 2019-01-01 RX ADMIN — PIPERACILLIN SODIUM,TAZOBACTAM SODIUM 3.38 G: 3; .375 INJECTION, POWDER, FOR SOLUTION INTRAVENOUS at 12:24

## 2019-01-01 RX ADMIN — AMLODIPINE BESYLATE 10 MG: 10 TABLET ORAL at 09:00

## 2019-01-01 RX ADMIN — VITAMIN D, TAB 1000IU (100/BT) 2000 UNITS: 25 TAB at 08:48

## 2019-01-01 RX ADMIN — SODIUM CHLORIDE: 4.5 INJECTION, SOLUTION INTRAVENOUS at 11:11

## 2019-01-01 RX ADMIN — ALPRAZOLAM 0.25 MG: 0.25 TABLET ORAL at 20:07

## 2019-01-01 RX ADMIN — SODIUM CHLORIDE: 4.5 INJECTION, SOLUTION INTRAVENOUS at 23:47

## 2019-01-01 ASSESSMENT — ENCOUNTER SYMPTOMS
EYE PAIN: 0
RESPIRATORY NEGATIVE: 1
NAUSEA: 0
APNEA: 0
EYE ITCHING: 0
COLOR CHANGE: 0
COUGH: 0
GASTROINTESTINAL NEGATIVE: 1
VOMITING: 0
COUGH: 0
SHORTNESS OF BREATH: 0
CONSTIPATION: 0
SHORTNESS OF BREATH: 1
PHOTOPHOBIA: 0
CHOKING: 0
RESPIRATORY NEGATIVE: 1
EYE REDNESS: 0
SORE THROAT: 0
DIARRHEA: 0
CHEST TIGHTNESS: 0
SORE THROAT: 0
RHINORRHEA: 0
GASTROINTESTINAL NEGATIVE: 1
ABDOMINAL PAIN: 1
EYE DISCHARGE: 0
GASTROINTESTINAL NEGATIVE: 1
BACK PAIN: 0
RESPIRATORY NEGATIVE: 1
NAUSEA: 1
DIARRHEA: 0
GASTROINTESTINAL NEGATIVE: 1
ANAL BLEEDING: 0
EYE PAIN: 0
RECTAL PAIN: 0
RESPIRATORY NEGATIVE: 1
GASTROINTESTINAL NEGATIVE: 1
STRIDOR: 0
SINUS PRESSURE: 0
BLOOD IN STOOL: 0
ABDOMINAL PAIN: 0
VOMITING: 1
FACIAL SWELLING: 0
WHEEZING: 0
TROUBLE SWALLOWING: 0
RHINORRHEA: 0
WHEEZING: 0
ABDOMINAL DISTENTION: 0
EYE DISCHARGE: 0
CONSTIPATION: 1
RESPIRATORY NEGATIVE: 1
BACK PAIN: 0
VOICE CHANGE: 0

## 2019-01-01 ASSESSMENT — PAIN SCALES - GENERAL
PAINLEVEL_OUTOF10: 0
PAINLEVEL_OUTOF10: 3
PAINLEVEL_OUTOF10: 0

## 2019-01-01 ASSESSMENT — PATIENT HEALTH QUESTIONNAIRE - PHQ9
SUM OF ALL RESPONSES TO PHQ9 QUESTIONS 1 & 2: 0
1. LITTLE INTEREST OR PLEASURE IN DOING THINGS: 0
SUM OF ALL RESPONSES TO PHQ QUESTIONS 1-9: 0
2. FEELING DOWN, DEPRESSED OR HOPELESS: 0
SUM OF ALL RESPONSES TO PHQ QUESTIONS 1-9: 0

## 2019-01-01 ASSESSMENT — PAIN DESCRIPTION - LOCATION: LOCATION: GENERALIZED

## 2019-03-20 PROBLEM — D72.829 LEUKOCYTOSIS: Status: ACTIVE | Noted: 2019-01-01

## 2019-03-20 PROBLEM — I10 HTN (HYPERTENSION): Status: ACTIVE | Noted: 2019-01-01

## 2019-03-20 PROBLEM — K81.0 CHOLECYSTITIS, ACUTE: Status: ACTIVE | Noted: 2019-01-01

## 2019-03-20 NOTE — ED NOTES
Pt returned from CT, VS reassessed and stable. Pt updated on POC and family provided refreshments at this time. All verbalize understanding as well as no further needs at this time. Will continue to monitor.       Milan Obrien RN  03/20/19 [id number]

## 2019-03-20 NOTE — ED NOTES
Bed: 006A  Expected date: 3/20/19  Expected time: 3:48 PM  Means of arrival: [facility]  Comments:     Domo Laboy RN  03/20/19 4965

## 2019-03-20 NOTE — ED PROVIDER NOTES
[facility]  eMERGENCY dEPARTMENT eNCOUnter          [address] [facility]       Chief Complaint   Patient presents with    Nausea    Emesis    Fatigue       Nurses Notes reviewed and I agree except as noted in the HPI. HISTORY OF PRESENT ILLNESS    Shahram Wang is a 80 y.o. female with a history of a possible abdominal aortic aneurism who presents to the Emergency Department for the evaluation of nausea, emesis, and fatigue. Patient also admits to abdominal cramping and some constipation. She admits that she has been passing little gas and that her last bowel movement was yesterday. She admits that her last bowel movement was loose. She states that she had 7-8 episodes of vomiting today and that her vomit looked like coffee grounds. She ate breakfast around 12 PM today and admits that she did drink coffee. She denies cold and flu symptoms, blood in stool, dizziness, lightheadedness, and headaches. Patient recently saw her PCP for episodes of confusion which has been ongoing for the last few months. An MRI was preformed and microvascular changes and possible TIAs were appreciated. The patient was then started on Aricept, and states that her GI symptoms may be caused by the new medication. Patient is also being monitored for a possible abdominal aortic aneurism. Patient denies ever having any abdominal surgeries. She has a history of CAD, cancer, diabetes mellitus, hyperlipidemia, and hypertension. Patient has a history of CABG x4. Patient denies current abdominal pain and nausea and declines pain medication and antiemetics. Patient takes lopressor, benazepril, and iron supplement among others. No other complaints at this time. The HPI was provided by the patient. REVIEW OF SYSTEMS     Review of Systems   Constitutional: Positive for fatigue. Negative for appetite change, chills and fever. HENT: Negative for congestion, ear pain, rhinorrhea and sore throat.     Eyes: Negative for of the interstitium and cephalized pulmonary vessels suggesting pulmonary vascular congestion correlation advised               **This report has been created using voice recognition software. It may contain minor errors which are inherent in voice recognition technology. **      Final report electronically signed by Dr. Jamel Maier on 3/20/2019 7:40 PM      CT ABDOMEN PELVIS W IV CONTRAST Additional Contrast? None   Final Result      1. Distended gallbladder with gallstones and pericholecystic fluid correlation with symptoms, serology and ultrasound is advised. 2. Advanced degenerative changes lumbar spine and pelvis with chronic compression fractures. 3. Moderate scattered stool in the colon with apparent thickening of the sigmoid colon which could represent underdistention versus mild colitis. 4. Advanced atherosclerotic changes aorta or branch vessels. 5. Small bilateral pleural effusions. **This report has been created using voice recognition software. It may contain minor errors which are inherent in voice recognition technology. **      Final report electronically signed by Dr. Jamel Maier on 3/20/2019 7:16 PM      NM HEPATOBILIARY SCAN W EJECTION FRACTION    (Results Pending)       LABS:     Labs Reviewed   CBC WITH AUTO DIFFERENTIAL - Abnormal; Notable for the following components:       Result Value    WBC 11.9 (*)     RBC 3.45 (*)     Hemoglobin 10.3 (*)     Hematocrit 33.9 (*)     MCHC 30.4 (*)     RDW-CV 14.7 (*)     RDW-SD 52.9 (*)     Segs Absolute 10.9 (*)     Lymphocytes # 0.6 (*)     Monocytes # 0.3 (*)     All other components within normal limits   BRAIN NATRIURETIC PEPTIDE - Abnormal; Notable for the following components:    Pro-BNP 8484.0 (*)     All other components within normal limits   COMPREHENSIVE METABOLIC PANEL W/ REFLEX TO MG FOR LOW K - Abnormal; Notable for the following components:    Glucose 177 (*)     CREATININE 1.4 (*)     BUN 38 (*)     Alb 3.3 (*)     ALT <5 (*) All other components within normal limits   GLOMERULAR FILTRATION RATE, ESTIMATED - Abnormal; Notable for the following components:    Est, Glom Filt Rate 35 (*)     All other components within normal limits   POCT GLUCOSE - Abnormal; Notable for the following components:    POC Glucose 160 (*)     All other components within normal limits   RAPID INFLUENZA A/B ANTIGENS   TROPONIN   ANION GAP   OSMOLALITY   URINE RT REFLEX TO CULTURE   BASIC METABOLIC PANEL W/ REFLEX TO MG FOR LOW K   CBC       EMERGENCY DEPARTMENT COURSE:   Vitals:    Vitals:    03/20/19 1849 03/20/19 2018 03/20/19 2125 03/20/19 2353   BP: (!) 167/90 (!) 181/73 (!) 198/85 (!) 173/74   Pulse: 84 69 73 62   Resp: 16 16 16 16   Temp:   94.3 °F (34.6 °C) 97.5 °F (36.4 °C)   TempSrc:   Oral Oral   SpO2: 96% 97% 98% 92%   Weight:   106 lb 9.6 oz (48.4 kg)    Height:   5' 2\" (1.575 m)        5:07 PM: The patient was seen and evaluated. 6:45 PM: Sent message to hospitalist and was told to consult again at 7:00 PM    7:39 PM: Spoke with Dr. Gem Delgado who requests IR needle decompression due to CT results    7:45 PM: Consulted Dr. Fadia Mendez (radiology) who states that he is willing to preform IR but wants input from general surgery since this is a gallbladder problem    7:52 PM: Spoke with Dr. Arcelia Jeter (general surgery) who did not feel that IR or drainage was necessary in response to the possible cholecystitis, but he agreed to send Pretty Ward PA-C to evaluate in case cholecystectomy was needed.     8:00 PM: Call received from Dr. Fadai Mendez (radiology), who reported that patient has a portion of colon blocking the are of the liver that he would need to go through to access the gallbladder for drainage, so IR was unable to be preformed    8:10 PM: I updated the patient and family who were amenable to admitting the patient    8:19 PM; Treasure Lainez PA-C admitted patient to the general surgery and consulted the hospitalist for med management    MDM:  The patient

## 2019-03-20 NOTE — ED TRIAGE NOTES
Pt presents to ER via squad with nausea, vomiting, and weakness that started suddenly today. Pt has vomited several times already. Established 18 g IV in left AC. EKG completed, shows afib. /91, pt states she took her medications this morning like she normally does. Pt is alert and oriented x4. Pt is slow to respond at times, appears to be drowsy.

## 2019-03-21 NOTE — CARE COORDINATION
[address] Transitions Interview     3/21/2019    Patient: Sigrid Callaway Patient : 1929   MRN: [id number]  Reason for Admission: Gallbladder disease, N/V, and elevated BNP  RARS: Readmission Risk Score: 17    Met with: Patient's daughter for inpatient Care Transition interview. Identified self/role. Explained CTC process, verbalized understanding. Readmission Risk  Patient Active Problem List   Diagnosis    Cervical cancer ([facility] [address]    Carotid stenosis, asymptomatic    Aortic ectasia, abdominal (HCC)    Other hyperlipidemia    Osteoporosis    Chronic kidney disease    Idiopathic gout    Gastroesophageal reflux disease    Hypothyroidism    Cholecystitis, acute    Leukocytosis    HTN (hypertension)    Gallbladder disease       Inpatient Assessment  Care Transitions Summary    Care Transitions Inpatient Review  Medication Review  Are you able to afford your medications?:  Yes  How often do you have difficulty taking your medications?:  I always take them as prescribed. Housing Review  Who do you live with?:  Partner/Spouse/SO  Are you an active caregiver in your home?:  No  Social Support  Do you have a ?:  No  Do you have a [address] [place] [address]?:  No  Durable Medical Equipment  Patient DME:  Quad cane, Walker, Shower chair  Functional Review  Ability to seek help/take action for Emergent/Urgent situations i.e. fire, crime, inclement weather or health crisis. :  Independent  Ability handle personal hygiene needs (bathing/dressing/grooming):  Needs Assistance  Ability to manage medications:  Needs Assistance  Ability to prepare food:  Needs Assistance  Ability to maintain home (clean home, laundry):  Needs Assistance  Ability to drive and/or has transportation:  Needs Assistance  Ability to do shopping:  Needs Assistance  Ability to manage finances:  Needs Assistance  Hearing and Vision  Visual Impairment:  Reading glasses  Hearing Impairment:  None  Care Transitions Interventions    Social Work:  Completed         Patient presented to the ED on 03/20/2019 with report of fatigue and N/V. PMH includes CAD, cancer, DM, GERD, HLD, and HTN. In ED, CT showed distended gallbladder with gallstones and pericholecystic fluid. Patient admitted under General Surgery for gallbladder disease, N/V, and elevated BNP. Consult with Hospitalist. Case management following. Patient is from home where she resides with her . Daughter states patient can drive but generally defers to  and daughter for transportation. Daughter states patient has a glucometer and blood pressure cuff for chronic disease management. Daughter states patient uses a walker for ambulation and furniture is arranged in the home for patient to [facility] onto things\" while ambulating to restroom. Daughter states patient has been \"forgetful\" and has not been taking blood pressure medication. Daughter requesting information on Home Health. CTC explained process for Home Health and services provided, daughter verbalized understanding. Carter Fountain with Case Management notified of [facility] - social work consult placed. Daughter denies additional needs or concerns at this time. Business card with contact information for CTC provided. Daughter understands CTC will follow upon discharge. Carter Fountain with Case Management notified of CTC inpatient interview. [facility] referral for HTN and medication compliance. Will route to [facility] upon CTC completion. Follow Up  Future Appointments   Date Time Provider Blas Billings   6/17/2019 10:30 AM Fabien Kenny DO [address]   [address] Maintenance  There are no preventive care reminders to display for this patient.     Macarena Gamez, RN  Care Transition Coordinator  (V) [phone]  (F) [phone]

## 2019-03-21 NOTE — CONSULTS
End Date Taking? Authorizing Provider   metoprolol tartrate (LOPRESSOR) 50 MG tablet Take 50 mg by mouth 2 times daily  3/18/19  Yes Historical Provider, MD   donepezil (ARICEPT) 5 MG tablet Take 1 tablet by mouth nightly 3/18/19  Yes Jorge Opal, DO   levothyroxine (SYNTHROID) 88 MCG tablet Take 1 tablet by mouth daily 3/18/19  Yes Jorge Hanksl, DO   Misc Natural Products (OSTEO BI-FLEX TRIPLE STRENGTH PO) Take by mouth   Yes Historical Provider, MD   benazepril (LOTENSIN) 20 MG tablet TAKE 1 TABLET BY MOUTH DAILY 12/17/18  Yes Jorge Louis, DO   Potassium 99 MG TABS Take 1 tablet by mouth 2 times daily    Yes Historical Provider, MD   ranitidine (ZANTAC) 150 MG tablet Take 75 mg by mouth 2 times daily    Yes Historical Provider, MD   Calcium Carbonate (CALCIUM 600 PO) Take 1 tablet by mouth daily    Yes Historical Provider, MD   GLUCOSAMINE-CHONDROITIN PO Take 1 tablet by mouth 2 times daily   Yes Historical Provider, MD   Ferrous Sulfate (IRON) 325 (65 Fe) MG TABS Take 2 tablets by mouth daily   Yes Historical Provider, MD   Cholecalciferol (VITAMIN D3) 2000 units CAPS Take 1 capsule by mouth daily   Yes Historical Provider, MD   Docusate Calcium (STOOL SOFTENER PO) Take 2 tablets by mouth nightly   Yes Historical Provider, MD   Multiple Vitamin (DAILY VITAMIN FORMULA PO) Take 1 tablet by mouth daily. Yes Historical Provider, MD       Allergies:  Patient has no known allergies. Social History:       TOBACCO:   reports that she quit smoking about 24 years ago. She has never used smokeless tobacco.  ETOH:   reports that she does not drink alcohol. Family History:     Positive as follows:        Problem Relation Age of Onset    Cancer Mother     Arthritis Mother     Asthma Mother     Cancer Father     Hearing Loss Father        Diet:  Diet NPO Effective Now Exceptions are: Ice Chips, Sips with Meds    REVIEW OF SYSTEMS:   Pertinent positives as noted in the HPI.  All other systems  Gastroesophageal reflux disease [K21.9] 02/20/2018    Chronic kidney disease [N18.9] 02/20/2018       PLAN:    1. By RCRI patient is Class III ~10% perioperative risk, but does not have any absolute contraindications to surgery. Given her elevated BNP the recommendation is EKG in PACU and daily troponin for 48h post operatively. We will also check an echo and would recommend judicious IVF perioperatively. 2. Getting repeat EKG, if patient is in new onset AFib would recommend cardiology consult for more complete pre-operative cardiac evaluation  3. Monitoring BP and will adjust as needed         Thank you for the consultation.     Electronically signed by Nikki Stern DO on 3/21/2019 at 12:27 AM

## 2019-03-21 NOTE — FLOWSHEET NOTE
03/20/19 2244   Provider Notification   Reason for Communication Evaluate   Provider Name Dr. Carloz Sanches   Provider Notification Physician   Method of Communication Secure Message   Response Waiting for response   Notification Time [phone]   hosp consult for medical management and surgical clearance COPD (chronic obstructive pulmonary disease) Septic shock COPD (chronic obstructive pulmonary disease) Microscopic hematuria Urinary tract infection with hematuria, site unspecified Aspiration pneumonia

## 2019-03-21 NOTE — PROGRESS NOTES
This clinician attempted to complete Trauma SBIRT. Patient not in room. Spoke with patients nurse. Will inform MIKE when patient available to complete Trauma SBIRT.

## 2019-03-21 NOTE — PROGRESS NOTES
[facility]  Daily Progress Note    Pt Name: Adrian Wilder  Medical Record Number: [id number]  Date of Birth 6/27/1929   Today's Date: 3/21/2019    Hospital day # 1     ASSESSMENT   1. Cholelithiasis with possible acute cholecystitis  2. Leukocytosis - resolved   3. Hypertension  4. New onset Alzheimer's    has a past medical history of Arthritis, CAD (coronary artery disease), Cancer ([facility] [address] Diabetes mellitus ([facility] [address] GERD (gastroesophageal reflux disease), Hematuria, Hyperlipidemia, Hypertension, Hypothyroidism, Kidney atrophy, Other disorders of kidney and ureter, Thyroid disease, and UTI (urinary tract infection). PLAN   1. Hida scan pending  2. Medicine following for risk stratification. ECHO completed - awaiting results. Repeat EKG today. Appreciate assistance. 3. Okay for clear liquids  4. Pain & nausea control  5. Continue to monitor blood pressure  6. Sugar control   7. Continue antibiotics  8. Labs look good this morning  9. Abdomen benign this morning. Awaiting hida scan results. If gallbladder not filling then would proceed with cholecystectomy. If hida looks okay then would discuss options with patient and family in regards to conservative managment. She is okay with proceeding with surgery if needed. SUBJECTIVE   Chief complaint: No complaints     Patient was stable overnight. Chart reviewed. Updated by nursing staff. Patient appropriate this morning. She is alert to name and place. Disoriented to the situation. Daughter present. Denies chest discomfort or dyspnea. Epigastric pain resolved. No N/V. (+) belching, flatus. No BM. Pain controlled with analgesia. Up with assistance.    CURRENT MEDICATIONS   Scheduled Meds:   sodium chloride flush  10 mL Intravenous 2 times per day    famotidine (PEPCID) injection  20 mg Intravenous Daily    piperacillin-tazobactam (ZOSYN) 3.375 g in dextrose 5% IVPB extended infusion (mini-bag)  3.375 g Intravenous Q8H    insulin lispro  0-6 Units Subcutaneous TID WC    insulin lispro  0-3 Units Subcutaneous Nightly    lisinopril  20 mg Oral Daily    calcium elemental  500 mg Oral Daily    vitamin D  2,000 Units Oral Daily    docusate sodium  100 mg Oral Nightly    donepezil  5 mg Oral Nightly    ferrous sulfate  650 mg Oral Daily    levothyroxine  88 mcg Oral Daily    metoprolol tartrate  50 mg Oral BID     Continuous Infusions:   sodium chloride 100 mL/hr at 19 0842    dextrose       PRN Meds:.sodium chloride flush, acetaminophen, magnesium hydroxide, ondansetron, glucose, dextrose, glucagon (rDNA), dextrose  OBJECTIVE   CURRENT VITALS:  height is 5' 2\" (1.575 m) and weight is 115 lb 4.8 oz (52.3 kg). Her oral temperature is 96.7 °F (35.9 °C). Her blood pressure is 187/79 (abnormal) and her pulse is 64. Her respiration is 16 and oxygen saturation is 92%. Temperature Range (24h):Temp: 96.7 °F (35.9 °C) Temp  Av.7 °F (35.9 °C)  Min: 94.3 °F (34.6 °C)  Max: 97.5 °F (36.4 °C)  BP Range (01N): Systolic (98QIV), XXM:886 , Min:158 , UKY:922     Diastolic (42EBQ), PXU:59, Min:62, Max:109    Pulse Range (24h): Pulse  Av.3  Min: 57  Max: 100  Respiration Range (24h): Resp  Av.6  Min: 16  Max: 20  Current Pulse Ox (24h):  SpO2: 92 %  Pulse Ox Range (24h):  SpO2  Av.6 %  Min: 92 %  Max: 98 %  Oxygen Amount and Delivery:    Incentive Spirometry Tx: Respiratory Effort: (encourage use)          GENERAL: alert, cooperative, no distress  SKIN: Skin color, texture, turgor normal. No rashes or lesions. HEENT: Head is normocephalic, atraumatic. NECK: Supple, symmetrical, trachea midline  LUNGS: clear to ausculation, without wheezes, rales or rhonci  HEART: normal rate and regular rhythm  ABDOMEN: soft, non-tender, non-distended, bowel sounds present  NEUROLOGIC: There are no focalizing motor or sensory deficits. CN II-XII are grossly intact.   EXTREMITIES: no cyanosis, no clubbing and no

## 2019-03-21 NOTE — FLOWSHEET NOTE
03/20/19 2148   Provider Notification   Reason for Communication Evaluate;New orders   Provider Name Ghassan Lynch   Provider Notification Physician Assistant   Method of Communication Call   Response Waiting for response   Notification Time 2148   bp 208/97- need orders for home metoprolol and what else can we do for her?

## 2019-03-21 NOTE — H&P
diarrhea or hematochezia. CT scan ordered by ED provider shows distended gallbladder with gallstones and mild pericholecystic fluid. Patient's white count is elevated at 11.9. Patient will be admitted under general surgery with consultation to hospitalist for surgical clearance. We will start patient on Zosyn antibiotics and HIDA scan has been ordered tomorrow a.m. for further evaluation. Patient may be added to surgical schedule tomorrow afternoon pending results. Past Medical History   has a past medical history of Arthritis, CAD (coronary artery disease), Cancer ([facility] [address] Diabetes mellitus ([facility] [address] GERD (gastroesophageal reflux disease), Hematuria, Hyperlipidemia, Hypertension, Hypothyroidism, Kidney atrophy, Other disorders of kidney and ureter, Thyroid disease, and UTI (urinary tract infection). Past Surgical History   has a past surgical history that includes Stomach surgery; Coronary artery bypass graft; Coronary artery bypass graft; Knee arthroscopy (Left, 1983); Bunionectomy (Bilateral, 1993); Colonoscopy; back surgery (2008); Breast biopsy (Left, 1975); Knee arthroscopy (Right, 1991); Nose surgery (1996); Knee arthroscopy (Right, 2009); Coronary artery bypass graft (2006); and other surgical history (5/23/2013). Medications  Prior to Admission medications    Medication Sig Start Date End Date Taking?  Authorizing Provider   metoprolol tartrate (LOPRESSOR) 50 MG tablet Take 50 mg by mouth daily 3/18/19  Yes Historical Provider, MD   donepezil (ARICEPT) 5 MG tablet Take 1 tablet by mouth nightly 3/18/19  Yes Gissell Wilcox DO   levothyroxine (SYNTHROID) 88 MCG tablet Take 1 tablet by mouth daily 3/18/19  Yes Gissell Wilcox DO   Misc Natural Products (OSTEO BI-FLEX TRIPLE STRENGTH PO) Take by mouth   Yes Historical Provider, MD   benazepril (LOTENSIN) 20 MG tablet TAKE 1 TABLET BY MOUTH DAILY 12/17/18  Yes Gissell Wilcox DO   Potassium 99 MG TABS Take 2 tablets by mouth daily   Yes Historical Provider, MD   ranitidine (ZANTAC) 150 MG tablet Take 150 mg by mouth nightly   Yes Historical Provider, MD   Calcium Carbonate (CALCIUM 600 PO) Take 2 tablets by mouth daily   Yes Historical Provider, MD   GLUCOSAMINE-CHONDROITIN PO Take 1 tablet by mouth 2 times daily   Yes Historical Provider, MD   Ferrous Sulfate (IRON) 325 (65 Fe) MG TABS Take 2 tablets by mouth daily   Yes Historical Provider, MD   Cholecalciferol (VITAMIN D3) 2000 units CAPS Take 1 capsule by mouth daily   Yes Historical Provider, MD   Docusate Calcium (STOOL SOFTENER PO) Take 2 tablets by mouth nightly   Yes Historical Provider, MD   Multiple Vitamin (DAILY VITAMIN FORMULA PO) Take 1 tablet by mouth daily. Yes Historical Provider, MD    Scheduled Meds:  Continuous Infusions:  PRN Meds:. Allergies  has No Known Allergies. Family History  family history includes Arthritis in her mother; Asthma in her mother; Cancer in her father and mother; Hearing Loss in her father. Social History   reports that she quit smoking about 24 years ago. She has never used smokeless tobacco. She reports that she does not drink alcohol or use drugs. Review of Systems  General Fatigue, Denies any fever or chills  HEENT Denies any diplopia, tinnitus or vertigo  Resp Denies any shortness of breath, cough or wheezing  Cardiac Denies any chest pain, palpitations, claudication or edema  GI Nausea, Emesis, Crampy ABD pain, Denies any melena, hematochezia, hematemesis or pyrosis   Denies any frequency, urgency, hesitancy or incontinence  Heme Denies bruising or bleeding easily  Endocrine Hypothyroid, DM Type II but not on medication   Neuro Denies any focal motor or sensory deficits  SUBJECTIVE:   CURRENT VITALS:  oral temperature is 97.5 °F (36.4 °C). Her blood pressure is 181/73 (abnormal) and her pulse is 69. Her respiration is 16 and oxygen saturation is 97%. There is no height or weight on file to calculate BMI.   Temperature Range (24h):Temp: 97.5 °F (36.4 °C) Temp  Av.5 °F (36.4 °C)  Min: 97.5 °F (36.4 °C)  Max: 97.5 °F (36.4 °C)  BP Range (94F): Systolic (13NFF), NATALY:866 , Min:167 , YPC:864     Diastolic (43JFF), TIW:34, Min:73, Max:109    Pulse Range (24h): Pulse  Av.5  Min: 69  Max: 100  Respiration Range (24h): Resp  Av.3  Min: 16  Max: 20  Current Pulse Ox (24h):  SpO2: 97 %  Pulse Ox Range (24h):  SpO2  Av.8 %  Min: 95 %  Max: 97 %  Oxygen Amount and Delivery:    CONSTITUTIONAL: Alert and oriented times 3, no acute distress and cooperative to examination with proper mood and affect. SKIN: Skin color, texture, turgor normal. No rashes or lesions. LYMPH: no cervical nodes, no inguinal nodes  HEENT: Head is normocephalic, atraumatic. EOMI, PERRLA. NECK: Supple, symmetrical, trachea midline, no adenopathy, thyroid symmetric, not enlarged and no tenderness, skin normal.  CHEST/LUNGS: chest symmetric with normal A/P diameter, normal respiratory rate and rhythm, lungs clear to auscultation without wheezes, rales or rhonchi. No accessory muscle use. Previous CABG scar noted   CARDIOVASCULAR: Heart sounds are normal.  Regular rate and rhythm without murmur, gallop or rub. Normal S1 and S2. Carotid and femoral pulses 2+/4 and equal bilaterally. ABDOMEN: Normal shape. Hypoactive bowel sounds. Soft, nondistended, no masses or organomegaly. no evidence of hernia. Tenderness: absent. Previous cramping pain gone, negative Obando's sign   RECTAL: deferred, not clinically indicated  NEUROLOGIC: There are no focalizing motor or sensory deficits. CN II-XII are grossly intact. Reports questionable TIA hx? EXTREMITIES: no cyanosis, no clubbing and no edema.   LABS:     Recent Labs     19  1711   WBC 11.9*   HGB 10.3*   HCT 33.9*         K 4.4      CO2 27   BUN 38*   CREATININE 1.4*   CALCIUM 9.2   AST 12   ALT <5*   BILITOT 0.4     RADIOLOGY:     XR CHEST PORTABLE   Final Result   Prominence of the interstitium and cephalized pulmonary vessels suggesting pulmonary vascular congestion correlation advised               **This report has been created using voice recognition software. It may contain minor errors which are inherent in voice recognition technology. **      Final report electronically signed by Dr. Sarina Campos on 3/20/2019 7:40 PM      CT ABDOMEN PELVIS W IV CONTRAST Additional Contrast? None   Final Result      1. Distended gallbladder with gallstones and pericholecystic fluid correlation with symptoms, serology and ultrasound is advised. 2. Advanced degenerative changes lumbar spine and pelvis with chronic compression fractures. 3. Moderate scattered stool in the colon with apparent thickening of the sigmoid colon which could represent underdistention versus mild colitis. 4. Advanced atherosclerotic changes aorta or branch vessels. 5. Small bilateral pleural effusions. **This report has been created using voice recognition software. It may contain minor errors which are inherent in voice recognition technology. **      Final report electronically signed by Dr. Sarina Campos on 3/20/2019 7:16 PM          Thank you for the interesting evaluation. Further recommendations to follow. Electronically signed by Benjamin Porter PA-C on 3/20/2019 at 9:32 PM    Patient seen and examined independently by me yesterday evening. Above discussed and I agree with OSWALDO Geiger. See my additional comments below for updated orders and plan. Labs, cultures, and radiographs where available were reviewed. I discussed patient concerns with the patient's nurse and instructions were given. Please see our orders for the updated patient care plan. - Admitted for further observation. Bowel rest.  IV antibiotics. IV fluid hydration. Repeat labs in a.m. Pain control as needed. Serial abdominal examinations. HIDA scan.   Discuss the risks, benefits and alternatives to possible cholecystectomy versus conservative treatment plan.  Medical clearance/risk stratification.     Electronically signed by Nadeem Cox MD on 3/21/2019 at 6:38 AM

## 2019-03-21 NOTE — CARE COORDINATION
3/21/19, 7:22 AM      Trevon Camejo       Admitted from: ED 3/20/2019/ [facility] day: 1   Location: [address] [place] [address]-A Reason for admit: Cholecystitis, acute [K81.0] Status: IP  Admit order signed?: yes  PMH:  has a past medical history of Arthritis, CAD (coronary artery disease), Cancer ([address] Diabetes mellitus ([facility] [address] GERD (gastroesophageal reflux disease), Hematuria, Hyperlipidemia, Hypertension, Hypothyroidism, Kidney atrophy, Other disorders of kidney and ureter, Thyroid disease, and UTI (urinary tract infection). Procedure: ECHO pending  Pertinent abnormal Imaging:CXR    Impression:       Prominence of the interstitium and cephalized pulmonary vessels suggesting pulmonary vascular congestion correlation advised     CT Abdomen and Pelvis W IV Contrast    Impression:         1. Distended gallbladder with gallstones and pericholecystic fluid correlation with symptoms, serology and ultrasound is advised. 2. Advanced degenerative changes lumbar spine and pelvis with chronic compression fractures. 3. Moderate scattered stool in the colon with apparent thickening of the sigmoid colon which could represent underdistention versus mild colitis. 4. Advanced atherosclerotic changes aorta or branch vessels. 5. Small bilateral pleural effusions.          Medications:  Scheduled Meds:   sodium chloride flush  10 mL Intravenous 2 times per day    famotidine (PEPCID) injection  20 mg Intravenous Daily    piperacillin-tazobactam (ZOSYN) 3.375 g in dextrose 5% IVPB extended infusion (mini-bag)  3.375 g Intravenous Q8H    insulin lispro  0-6 Units Subcutaneous TID WC    insulin lispro  0-3 Units Subcutaneous Nightly    lisinopril  20 mg Oral Daily    calcium elemental  500 mg Oral Daily    vitamin D  2,000 Units Oral Daily    docusate sodium  100 mg Oral Nightly    donepezil  5 mg Oral Nightly    ferrous sulfate  650 mg Oral Daily    levothyroxine  88 mcg Oral Daily    metoprolol tartrate  50 mg Oral BID Continuous Infusions:   sodium chloride 100 mL/hr at 03/20/19 2251    dextrose        Pertinent Info/Orders/Treatment Plan:  BUN 42. Creatinine 1.5, BNP 8484.0, WBC 11.9, IV fluids, IV antibiotics, IV Pepcid, diabetes management, Hospitalist consult. Diet: Diet NPO Effective Now Exceptions are: Ice Chips, Sips with Meds   Smoking status:  reports that she quit smoking about 24 years ago. She has never used smokeless tobacco.   PCP: Rigoberto Cox DO  Readmission: no  Readmission Risk Score: 17%    Discharge Planning  Current Residence:  Private Residence  Living Arrangements:  Spouse/Significant Other   Support Systems:  Children, Family Members, Spouse/Significant Other  Current Services PTA:     Potential Assistance Needed:  Home Care  Potential Assistance Purchasing Medications:  No  Does patient want to participate in local refill/ meds to beds program?  No  Type of Home Care Services:  None  Patient expects to be discharged to:  Home  Expected Discharge date: Follow Up Appointment: Best Day/ Time: Monday AM    Discharge Plan: Met with Katt's daughter while she was at testing. Lupillo Soriano currently lives at home with her spouse with family support. She uses a walker. Plan at discharge is to return home. Pt and daughter Austin Llamas are requesting [place] services at discharge. Denies need for DME.       Evaluation: yes

## 2019-03-21 NOTE — FLOWSHEET NOTE
03/21/19 0417   Provider Notification   Reason for Communication Evaluate;New orders   Provider Name Dr. Aracely Hernandez   Provider Notification Physician   Method of Communication Secure Message   Response See orders   Notification Time 28-35-90-03   bp dropped to 173/74 with metoprolol, back up to 188/78- orders given to give morning bp meds early

## 2019-03-21 NOTE — ED NOTES
Patient to 5e50. Spoke to RN and let her know that the patient will be on the way to the floor.       Gema Dominique, [place]  13/99/31 3386

## 2019-03-21 NOTE — ED NOTES
Pt resting in bed with family at bedside, VS reassessed and stable. Luke Rios NP in room to update pt and family on POC all verbalize understanding as well as no further needs at this time. Damián De Leon RN  03/20/19 2022

## 2019-03-21 NOTE — PROGRESS NOTES
Hospitalist Progress Note    Patient:  Star Rodriguez      Unit/Bed:5E-50/050-A    YOB: 1929    MRN: [id number]       Acct: [de-identified]     PCP: Christine Alexandre DO    Date of Admission: 3/20/2019    Chief Complaint: consult to [address] [place] [address] service for pre-op eval     Hospital Course:     Please see H/P for details. In summary, this is a 80 y.o. Female, with PMH CKD stage 3, CAD, s/p CABG, DM type 2, Hyperlipidemia, HTN, who was admitted under Surgery service due to cholelithiasis with suspected acute cholecystitis. Hospital Medicine service was consulted for pre-op eval. Patient was seen by Dr. Abdirizak Cerda on 3/21/19. Her initial EKG showed Afib, rate controlled ( compare to EKG in 5/6/2013, previous EKG showed sinus bradycardia, rate 53. Repeat EKG on 3/21/2019 showed sinus bradycardia, rate 59. CXR on 3/20/19 showed pulmonary congestion. Pro-BNP on 3/20/19 was elevated at 8484. Echo was done, pending report. CT abd/pelvis on 3/20/19 showed distended gallbladder with gallstones and pericholecystic fluid. Surgery ordered HIDA scan, which was normal.        Subjective:     Patient seen and examined. Pt denies abd pain, N/V, cough, sob, orthopnea, PND, chest pain. Granddaughter at bedside during my rounds.          Medications:  Reviewed    Infusion Medications    sodium chloride 100 mL/hr at 03/21/19 0842    dextrose       Scheduled Medications    sodium chloride flush  10 mL Intravenous 2 times per day    famotidine (PEPCID) injection  20 mg Intravenous Daily    piperacillin-tazobactam (ZOSYN) 3.375 g in dextrose 5% IVPB extended infusion (mini-bag)  3.375 g Intravenous Q8H    insulin lispro  0-6 Units Subcutaneous TID WC    insulin lispro  0-3 Units Subcutaneous Nightly    lisinopril  20 mg Oral Daily    calcium elemental  500 mg Oral Daily    vitamin D  2,000 Units Oral Daily    docusate sodium  100 mg Oral Nightly    donepezil  5 mg Oral Nightly    ferrous sulfate 650 mg Oral Daily    levothyroxine  88 mcg Oral Daily    metoprolol tartrate  50 mg Oral BID     PRN Meds: sodium chloride flush, acetaminophen, magnesium hydroxide, ondansetron, glucose, dextrose, glucagon (rDNA), dextrose      Intake/Output Summary (Last 24 hours) at 3/21/2019 1835  Last data filed at 3/21/2019 1557  Gross per 24 hour   Intake 1535.31 ml   Output 275 ml   Net 1260.31 ml       Diet:  Diet NPO, After Midnight Exceptions are: Ice Chips, Sips with Meds  DIET DENTAL SOFT;    Exam:  /64   Pulse 60   Temp 96.2 °F (35.7 °C) (Oral)   Resp 15   Ht 5' 2\" (1.575 m)   Wt 115 lb 4.8 oz (52.3 kg)   LMP  (Exact Date)   SpO2 91%   BMI 21.09 kg/m²     General appearance: alert, not in acute distress   HEENT: clear oral mucosa. Neck: Supple, with full range of motion. Respiratory:  Normal respiratory effort. (+) fine rales on RLL field. Cardiovascular: normal rate, regular rhythm, (+) grade 2/6 systolic murmur on aortic and pulmonic area  Abdomen: Soft, non-tender, non-distended. Exam of extremities: peripheral pulses normal, no pedal edema, no clubbing or cyanosis. SCD in placed on both legs. Labs:   Recent Labs     03/20/19  1711 03/21/19  0433   WBC 11.9* 9.6   HGB 10.3* 9.5*   HCT 33.9* 31.0*    221     Recent Labs     03/20/19  1711 03/21/19  0433    143   K 4.4 4.6    107   CO2 27 25   BUN 38* 42*   CREATININE 1.4* 1.5*   CALCIUM 9.2 9.3     Recent Labs     03/20/19  1711   AST 12   ALT <5*   BILITOT 0.4   ALKPHOS 60     No results for input(s): INR in the last 72 hours. No results for input(s): Nanetta Katherine in the last 72 hours.     Urinalysis:      Lab Results   Component Value Date    NITRU NEGATIVE 03/21/2019    WBCUA 25-50 03/21/2019    WBCUA >200 02/15/2012    BACTERIA NONE 03/21/2019    RBCUA 5-10 03/21/2019    BLOODU TRACE 03/21/2019    SPECGRAV 1.025 12/23/2017    GLUCOSEU NEGATIVE 03/21/2019       Radiology:  NM HEPATOBILIARY SCAN W EJECTION FRACTION   Final Result   Normal nuclear medicine hepatobiliary scan with a gallbladder ejection fraction of 69%. **This report has been created using voice recognition software. It may contain minor errors which are inherent in voice recognition technology. **      Final report electronically signed by Dr. Renate Ivan on 3/21/2019 4:13 PM      XR CHEST PORTABLE   Final Result   Prominence of the interstitium and cephalized pulmonary vessels suggesting pulmonary vascular congestion correlation advised               **This report has been created using voice recognition software. It may contain minor errors which are inherent in voice recognition technology. **      Final report electronically signed by Dr. Aury Tyler on 3/20/2019 7:40 PM      CT ABDOMEN PELVIS W IV CONTRAST Additional Contrast? None   Final Result      1. Distended gallbladder with gallstones and pericholecystic fluid correlation with symptoms, serology and ultrasound is advised. 2. Advanced degenerative changes lumbar spine and pelvis with chronic compression fractures. 3. Moderate scattered stool in the colon with apparent thickening of the sigmoid colon which could represent underdistention versus mild colitis. 4. Advanced atherosclerotic changes aorta or branch vessels. 5. Small bilateral pleural effusions. **This report has been created using voice recognition software. It may contain minor errors which are inherent in voice recognition technology. **      Final report electronically signed by Dr. Aury Tyler on 3/20/2019 7:16 PM            Assessment/Plan: This is a 80 y.o. Female        1. Acute cholelithiasis     -HIDA scan normal  -surgery managing    2. Paroxysmal Atrial fibrillation, currently in sinus rhythm, rate controlled, CHADsVasc score 5    -consider tele monitor   -discussed to pt and granddaughter, given CHADsVasc score 5, pt is at risk for stroke and need [address] Discussed also possible side effects of [address]. Patient does not seem to understand, looks like she has underlying dementia ( per granddaughter, pt was recently started on aricept. Also, per granddaughter, pt does not have POA). -Consider Cardiology consult. -on metoprolol       3. CKD stage 3, stable    -creatinine 1.5 today (baseline creatinine 1.4 to 1.5)  -avoid nephrotoxins  -BMP in am    4. Pulmonary congestion noted on CXR 3/20/19    -pt is asymptomatic  -awaiting echo report  -pro-BNP elevated, ?underlying CHF vs due to CKD stage 3  -repeat CXR in am   -monitor, consider lasix if pt become symptomatic     5. Normocytic anemia, chronic, suspect anemia of chronic disease due to CKD3    -has been anemic since 12/2012, w baseline Hgb 9-10. Hgb today 9.5  -no sx of bleeding  -check iron panel, B12 and folate     6. HTN, controlled    -cont lisinopril and metoprolol  -VS per protocol    7. DM type 2, controlled    -A1C in 3/2018 was 5.7  -cont accu-checks and SSI    8.  Leukocytosis, resolved         Anticipated Discharge in : per primary team         Diet: Diet NPO, After Midnight Exceptions are: Ice Chips, Sips with Meds  DIET DENTAL SOFT;    DVT prophylaxis: [] Lovenox                                 [] SCDs                                 [] SQ Heparin                                 [] Encourage ambulation           [] Already on Anticoagulation     Disposition:    [] Home       [] TCU       [] Rehab       [] Psych       [] SNF       [] [place]       [x] Other-per primary team     Code Status: Full Code        Electronically signed by Percy Tran MD on 3/21/2019 at 6:35 PM

## 2019-03-22 PROBLEM — E44.0 MODERATE MALNUTRITION (HCC): Chronic | Status: ACTIVE | Noted: 2019-01-01

## 2019-03-22 NOTE — PROGRESS NOTES
Disoriented to the situation. Daughter present. Denies chest discomfort or dyspnea. Epigastric pain resolved. No N/V. (+) belching, flatus. No BM. Denies any pain at all. Up with assistance. CURRENT MEDICATIONS   Scheduled Meds:   sodium chloride flush  10 mL Intravenous 2 times per day    famotidine (PEPCID) injection  20 mg Intravenous Daily    piperacillin-tazobactam (ZOSYN) 3.375 g in dextrose 5% IVPB extended infusion (mini-bag)  3.375 g Intravenous Q8H    insulin lispro  0-6 Units Subcutaneous TID WC    insulin lispro  0-3 Units Subcutaneous Nightly    lisinopril  20 mg Oral Daily    calcium elemental  500 mg Oral Daily    vitamin D  2,000 Units Oral Daily    docusate sodium  100 mg Oral Nightly    donepezil  5 mg Oral Nightly    ferrous sulfate  650 mg Oral Daily    levothyroxine  88 mcg Oral Daily    metoprolol tartrate  50 mg Oral BID     Continuous Infusions:   dextrose       PRN Meds:.sodium chloride flush, acetaminophen, magnesium hydroxide, ondansetron, glucose, dextrose, glucagon (rDNA), dextrose  OBJECTIVE   CURRENT VITALS:  height is 5' 2\" (1.575 m) and weight is 115 lb 4.8 oz (52.3 kg). Her oral temperature is 96.2 °F (35.7 °C). Her blood pressure is 193/83 (abnormal) and her pulse is 63. Her respiration is 18 and oxygen saturation is 94%.    Temperature Range (24h):Temp: 96.2 °F (35.7 °C) Temp  Av.6 °F (35.9 °C)  Min: 95.7 °F (35.4 °C)  Max: 98.2 °F (36.8 °C)  BP Range (56O): Systolic (04VNA), WSU:052 , Min:132 , QXP:800     Diastolic (86URH), LLS:75, Min:63, Max:83    Pulse Range (24h): Pulse  Av.4  Min: 58  Max: 68  Respiration Range (24h): Resp  Av.2  Min: 14  Max: 18  Current Pulse Ox (24h):  SpO2: 94 %  Pulse Ox Range (24h):  SpO2  Av.3 %  Min: 91 %  Max: 94 %  Oxygen Amount and Delivery:    Incentive Spirometry Tx: Respiratory Effort: (encourage use)          GENERAL: alert, cooperative, no distress  SKIN: Skin color, texture, turgor normal. No rashes or gallbladder ejection fraction is 69%.         Impression   Normal nuclear medicine hepatobiliary scan with a gallbladder ejection fraction of 69%.             **This report has been created using voice recognition software.  It may contain minor errors which are inherent in voice recognition technology. **       Final report electronically signed by Dr. Harsha Mejía on 3/21/2019 4:13 PM     Electronically signed by Leigh Pierce MD on 3/22/2019 at 12:02 PM     Patient seen and examined independently by me earlier this AM. Above discussed and I agree with Francois Codrova CNP. See my additional comments below for updated orders and plan. Labs, cultures, and radiographs where available were reviewed. I discussed patient concerns with the patient's nurse and instructions were given. Please see our orders for the updated patient care plan. - HIDA scan normal.  No acute disease based on HIDA scan. Continue antibiotics. Long discussion with daughter about the pros and cons of cholecystectomy verse conservative treatment. Planning to monitor only and if symptoms recur then cholecystectomy. Kidney function slightly elevated but urine output adequate - repeat labs in a.m. and monitor. Appreciate hospitalist service assistance. Chest x-ray reviewed. Advance diet as tolerated. Observe for another 24 hours and pending medical management possible discharge tomorrow. Monitor confusion based on events last night.     Electronically signed by Leigh Pierce MD on 3/22/2019 at 12:02 PM

## 2019-03-22 NOTE — PLAN OF CARE
Problem: Falls - Risk of:  Goal: Will remain free from falls  Description  Will remain free from falls  3/22/2019 0248 by Ophelia Tabares RN  Outcome: Met This Shift  Note:   No falls this shift. Fall precautions in place. Family at bedside. Tele-sitter at bedside. Patient becoming more confused and combative with staff. Bed alarm on, zone 2. Problem: Falls - Risk of:  Goal: Absence of physical injury  Description  Absence of physical injury  Outcome: Met This Shift  Note:   No physical injury this shift. Patient was combative and verbally abusive towards family and staff. Patient now resting comfortably in her bed, tele-sitter at bedside. Problem: Pain Control  Goal: Maintain pain level at or below patient's acceptable level (or 5 if patient is unable to determine acceptable level)  3/22/2019 0248 by Ophelia Tabares RN  Outcome: Met This Shift  Flowsheets (Taken 3/22/2019 0248)  Patient's Stated Pain Goal: No pain  Note:   Patient denies pain this shift. Problem: Cardiovascular  Goal: No DVT, peripheral vascular complications  1/06/5006 0248 by Ophelia Tabares RN  Outcome: Met This Shift  Note:   Patient has plus 2 peripheral pulses in all extremities and denies any pain or tenderness. Will continue to monitor. Problem: GI  Goal: No bowel complications  0/41/8791 0248 by Ophelia Tabares RN  Outcome: Met This Shift  Note:   Patient denies bowel pain this shift. Patient did try to have bowel movement but was unsuccessful. Problem: GI  Goal: Bowel movement at least every other day  Outcome: Ongoing  Note:   No bowel movement this shift. Problem: Skin Integrity/Risk  Goal: No skin breakdown during hospitalization  3/22/2019 0248 by Ophelia Tabares RN  Outcome: Ongoing  Note:   Patient has scattered abrasion and pre-existing redness to her butt and heels. No new breakdown noted.       Problem: Skin Integrity/Risk  Goal: Wound healing  Outcome: Ongoing  Note: Patient has scattered abrasions and redness to her butt and heels. Patient changes position often. Will continue to monitor. Problem: DISCHARGE BARRIERS  Goal: Patient's continuum of care needs are met  3/22/2019 0248 by Nancey Sandifer, RN  Outcome: Ongoing  Note:   No discharge needs voiced at  this time. Patient is from home with  and plans to return home with  after final decision made about removing her gallbladder or not today. Care plan reviewed with patient and family (granddaughter and grandson). Patient and family verbalize understanding of the plan of care and contribute to goal setting.    Electronically signed by Nancey Sandifer, RN on 3/22/2019 at 3:00 AM .

## 2019-03-22 NOTE — PROGRESS NOTES
[facility] - Dr. Deon Bravo  Daily Progress Note    Pt Name: Sandy Cuevas  Medical Record Number: [id number]  Date of Birth 6/27/1929   Today's Date: 3/22/2019    Hospital day # 2     ASSESSMENT   1. Cholelithiasis with possible acute cholecystitis  2. Leukocytosis   3. Hypertension  4. New onset Alzheimer's   5. Elevated troponin  6. History of atrial fibrillation   has a past medical history of Arthritis, CAD (coronary artery disease), Cancer ([facility] [address] Diabetes mellitus ([facility] [address] GERD (gastroesophageal reflux disease), Hematuria, Hyperlipidemia, Hypertension, Hypothyroidism, Kidney atrophy, Other disorders of kidney and ureter, Thyroid disease, and UTI (urinary tract infection). PLAN   1. Hida scan normal. EF 69%. No evidence of acute cholecystitis. 2. Medicine following for risk stratification. ECHO completed and reviewed. Recommendation for telemetry and cardiology consultation for clearance. Surgery on hold for now and will follow outpatient so no clearance needed at this time. 3. Medicine managing sugars and blood pressure. 4. Continue to monitor urine output. Kidney function slightly evaluated. Chest x-ray showed some mild CHF. 5. Okay for regular diet as tolerated   6. Pain & nausea control  7. Continue antibiotics for now   8. Labs reviewed. Leukocytosis this morning - unknown etiology. Will repeat in am.   9. Social service for discharge planning   10. Abdomen remains benign. Had some confusion overnight, but otherwise doing well from our standpoint. Hida scan normal. Discussed with daughter and patient and they would like to hold off on surgery if possible since not emergent. Will monitor labs and see how she does tolerating diet today. Hopefully home in the next 24 hours. SUBJECTIVE   Chief complaint: No complaints     Patient was stable overnight. Chart reviewed. Updated by nursing staff. Patient appropriate this morning. She is alert to name and place. Disoriented to the situation. Daughter present. Denies chest discomfort or dyspnea. Epigastric pain resolved. No N/V. (+) belching, flatus. No BM. Denies any pain at all. Up with assistance. CURRENT MEDICATIONS   Scheduled Meds:   sodium chloride flush  10 mL Intravenous 2 times per day    famotidine (PEPCID) injection  20 mg Intravenous Daily    piperacillin-tazobactam (ZOSYN) 3.375 g in dextrose 5% IVPB extended infusion (mini-bag)  3.375 g Intravenous Q8H    insulin lispro  0-6 Units Subcutaneous TID WC    insulin lispro  0-3 Units Subcutaneous Nightly    lisinopril  20 mg Oral Daily    calcium elemental  500 mg Oral Daily    vitamin D  2,000 Units Oral Daily    docusate sodium  100 mg Oral Nightly    donepezil  5 mg Oral Nightly    ferrous sulfate  650 mg Oral Daily    levothyroxine  88 mcg Oral Daily    metoprolol tartrate  50 mg Oral BID     Continuous Infusions:   dextrose       PRN Meds:.sodium chloride flush, acetaminophen, magnesium hydroxide, ondansetron, glucose, dextrose, glucagon (rDNA), dextrose  OBJECTIVE   CURRENT VITALS:  height is 5' 2\" (1.575 m) and weight is 115 lb 4.8 oz (52.3 kg). Her temperature is 96.2 °F (35.7 °C). Her blood pressure is 193/83 (abnormal) and her pulse is 63. Her respiration is 18 and oxygen saturation is 94%.    Temperature Range (24h):Temp: 96.2 °F (35.7 °C) Temp  Av.6 °F (35.9 °C)  Min: 95.7 °F (35.4 °C)  Max: 98.2 °F (36.8 °C)  BP Range (76O): Systolic (71UOP), AFF:703 , Min:132 , CHP:452     Diastolic (17YIV), TML:68, Min:63, Max:83    Pulse Range (24h): Pulse  Av.4  Min: 58  Max: 68  Respiration Range (24h): Resp  Av.2  Min: 14  Max: 18  Current Pulse Ox (24h):  SpO2: 94 %  Pulse Ox Range (24h):  SpO2  Av.3 %  Min: 91 %  Max: 94 %  Oxygen Amount and Delivery:    Incentive Spirometry Tx: Respiratory Effort: (encourage use)          GENERAL: alert, cooperative, no distress  SKIN: Skin color, texture, turgor normal. No rashes or ejection fraction is 69%.         Impression   Normal nuclear medicine hepatobiliary scan with a gallbladder ejection fraction of 69%.             **This report has been created using voice recognition software.  It may contain minor errors which are inherent in voice recognition technology. **       Final report electronically signed by Dr. Bernadette Freed on 3/21/2019 4:13 PM     Electronically signed by MONA Mendez CNP on 3/22/2019 at 10:13 AM

## 2019-03-22 NOTE — PROGRESS NOTES
Nutrition Assessment    Type and Reason for Visit: Initial(verbal request from staff - ? malnutrition)    Nutrition Recommendations:  Recommend MVI. ONS initiated TID. Consider add diabetic diet to diet order if hyperglycemia. (3/22: Glucose 100 mg/dl). Nutrition Assessment:   Pt. moderately malnourished AEB poor appetite for past 6-8 months, moderate loss of fat and moderate loss of muscle. At risk for further nutrition compromise r/t new onset dementia; hx DM , CKD and underlying medical condition - Cholelithiasis. Will send Glucerna TID as pt agreeable. Recommend MVI. Consider add diabetic diet to diet order if hyperglycemia. (3/22: Glucose 100 mg/dl). Malnutrition Assessment:  · Malnutrition Status: Meets the criteria for moderate malnutrition  · Context: Chronic illness  · Findings of the 6 clinical characteristics of malnutrition (Minimum of 2 out of 6 clinical characteristics is required to make the diagnosis of moderate or severe Protein Calorie Malnutrition based on AND/ASPEN Guidelines):  1. Energy Intake-Less than or equal to 75% of estimated energy requirement, Greater than or equal to 1 month    2. Fat Loss-Moderate subcutaneous fat loss, Orbital  3. Muscle Loss-Moderate muscle mass loss, Temples (temporalis muscle), Clavicles (pectoralis and deltoids), Calf (gastrocnemius), Scapula (trapezius)    Nutrition Risk Level: Moderate    Nutrient Needs:  · Estimated Daily Total Kcal: 4684-7998 kcals (25-30 kcals/kgm wt of 52 kgm)  · Estimated Daily Protein (g): 36-42 gm (0.7-0.8 gm/kgm wt of 52 kgm)    Nutrition Diagnosis:   · Problem:  Moderate malnutrition  · Etiology: related to Cognitive or neurological impairment     Signs and symptoms:  as evidenced by Diet history of poor intake, Moderate loss of subcutaneous fat, Moderate muscle loss    Objective Information:  · Nutrition-Focused Physical Findings: thin, frail  · Wound Type: None  · Current Nutrition Therapies:  · Oral Diet Orders: General   · Oral Diet intake: 26-50%  · Oral Nutrition Supplement (ONS) Orders: Diabetic Oral Supplement(TID)  · ONS intake: (initiated)  · Anthropometric Measures:  · Ht: 5' 2\" (157.5 cm)   · Current Body Wt: 115 lb 4.8 oz (52.3 kg)(3/21, no edema)  · Admission Body Wt: 106 lb 9.6 oz (48.4 kg)(3/20, no edema)  · Usual Body Wt: (per pt 132# 6-8 months ago; per EMR: 6/19/18: 106# 9.6 oz)  · % Weight Change:  ,  reported -19.2% weight loss in 6-8 months    · Ideal Body Wt:110#  ,  · BMI Classification: BMI 18.5 - 24.9 Normal Weight    Nutrition Interventions:   Continue current diet, Start ONS  Continued Inpatient Monitoring, Education Initiated, Coordination of Care(3/22 Encouraged good nutrition at best efforts. Encouraged use of ONS.  )    Nutrition Evaluation:   · Evaluation: Goals set   · Goals: Pt. will consume 75% or more of meals during LOS.     · Monitoring: Meal Intake, Supplement Intake, Diet Tolerance, Skin Integrity, Mental Status/Confusion, Weight, Pertinent Labs, Patient/Family Education, Monitor Bowel Function      Electronically signed by Nick Burrell RD, SHANDRA on 3/22/19 at 3:59 PM    Contact Number: [phone]

## 2019-03-22 NOTE — PLAN OF CARE
Problem: Falls - Risk of:  Goal: Will remain free from falls  Description  Will remain free from falls  Outcome: Ongoing  Note:   Up with one assist and walker, judit fair  Goal: Absence of physical injury  Description  Absence of physical injury  Outcome: Ongoing  Note:   No falls or injuries, bed alarm     Problem: Pain Control  Goal: Maintain pain level at or below patient's acceptable level (or 5 if patient is unable to determine acceptable level)  Outcome: Ongoing  Flowsheets (Taken 3/22/2019 1649)  Patient's Stated Pain Goal: No pain  Note:   Denies pain     Problem: Cardiovascular  Goal: No DVT, peripheral vascular complications  Outcome: Ongoing  Note:   No signs of DVT     Problem: GI  Goal: No bowel complications  Outcome: Ongoing  Note:   No bowel movement     Problem: Skin Integrity/Risk  Goal: No skin breakdown during hospitalization  Outcome: Ongoing  Note:   No skin break down, coccyx reddened     Problem: DISCHARGE BARRIERS  Goal: Patient's continuum of care needs are met  Outcome: Ongoing  Note:   Home with  and home health     Problem: Nutrition  Goal: Optimal nutrition therapy  3/22/2019 1649 by Maude Patton RN  Outcome: Ongoing  Note:   Taking diet fair and retained  3/22/2019 1559 by Jacqueline Wilkinson RD, LD  Outcome: Ongoing

## 2019-03-22 NOTE — PROGRESS NOTES
Pharmacy Renal Adjustment    Karl Corrigan is a 80 y.o. female. Pharmacy renally adjust the following medications per P&T approved policy: zosyn     Recent Labs     03/21/19  0433 03/22/19  0803   BUN 42* 46*       Recent Labs     03/21/19  0433 03/22/19  0803   CREATININE 1.5* 1.6*       Estimated Creatinine Clearance: 19 mL/min (A) (based on SCr of 1.6 mg/dL (H)).   Calculated CrCl: 18.5 ml/min    Height:   Ht Readings from Last 1 Encounters:   03/20/19 5' 2\" (1.575 m)     Weight:  Wt Readings from Last 1 Encounters:   03/21/19 115 lb 4.8 oz (52.3 kg)       CKD stage: 3         Baseline SCr: 1.4-1.5    Plan: Adjustments based on renal function:          Decrease zosyn 3.375 grams q8h to q12h    Garry AlegriaD, BCPS   3/22/2019  5:47 PM

## 2019-03-22 NOTE — PLAN OF CARE
Problem: Nutrition  Goal: Optimal nutrition therapy  Outcome: Ongoing   Nutrition Problem: Moderate malnutrition  Intervention: Food and/or Nutrient Delivery: Continue current diet, Start ONS  Nutritional Goals: Pt. will consume 75% or more of meals during LOS.

## 2019-03-23 NOTE — PROGRESS NOTES
[facility] - Dr. Catherine Mendez  Daily Progress Note    Pt Name: Maryjane Medrano  Medical Record Number: [id number]  Date of Birth 6/27/1929   Today's Date: 3/23/2019    Hospital day # 3     ASSESSMENT   1. Cholelithiasis with possible acute cholecystitis  2. Leukocytosis - improving   3. Hypertension  4. New onset Alzheimer's   5. Elevated troponin  6. History of atrial fibrillation  7. Chronic kidney insufficiency    has a past medical history of Arthritis, CAD (coronary artery disease), Cancer ([facility] [address] Diabetes mellitus ([facility] [address] GERD (gastroesophageal reflux disease), Hematuria, Hyperlipidemia, Hypertension, Hypothyroidism, Kidney atrophy, Other disorders of kidney and ureter, Thyroid disease, and UTI (urinary tract infection). PLAN   1. Hida scan normal. EF 69%. No evidence of acute cholecystitis. 2. Medicine following for risk stratification. ECHO completed and reviewed. Recommendation for telemetry and cardiology consultation for clearance. Surgery on hold for now and will follow outpatient so no clearance needed at this time. 3. Morning troponin was still elevated so up to medicine if they want cardiology to see   4. Medicine managing sugars and blood pressure. 5. Continue to monitor urine output. Kidney function slightly evaluated. Chest x-ray showed some mild CHF. 6. Tolerating diet pretty well   7. Denies any abdominal pain or nausea during assessment   8. Continue antibiotics for now   9. Labs reviewed. Leukocytosis a little better this morning. Kidney function stable. Replace K+. 10. Social service for discharge planning   11. Abdomen remains benign during assessment. Patient has lots of confusion and agitation at night requiring Haldol. Recommendation for patient to be able to go home and be in her own surroundings as long as abdomen remains benign. Daughter does not want to do surgery at this time if it is not 100% needed.  Pending medicine's recommendations for troponins we would be okay with her going home today if she continues to do well. SUBJECTIVE   Chief complaint: No complaints - sleepy     Patient was stable overnight, but has lots of agitation and confusion at night. Alert to name only this morning but pleasant. Chart reviewed. Updated by nursing staff. Patient appropriate this morning. Disoriented to the place and situation. Daughter present. Denies chest discomfort or dyspnea. Denies any abdominal pain or N/V. (+) belching, flatus. Having BMs. Denies any pain at all. Up with assistance. CURRENT MEDICATIONS   Scheduled Meds:   piperacillin-tazobactam (ZOSYN) 3.375 g in dextrose 5% IVPB extended infusion (mini-bag)  3.375 g Intravenous Q12H    sodium chloride flush  10 mL Intravenous 2 times per day    famotidine (PEPCID) injection  20 mg Intravenous Daily    insulin lispro  0-6 Units Subcutaneous TID WC    insulin lispro  0-3 Units Subcutaneous Nightly    lisinopril  20 mg Oral Daily    calcium elemental  500 mg Oral Daily    vitamin D  2,000 Units Oral Daily    docusate sodium  100 mg Oral Nightly    donepezil  5 mg Oral Nightly    ferrous sulfate  650 mg Oral Daily    levothyroxine  88 mcg Oral Daily    metoprolol tartrate  50 mg Oral BID     Continuous Infusions:   dextrose       PRN Meds:.hydrALAZINE, sodium chloride flush, acetaminophen, magnesium hydroxide, ondansetron, glucose, dextrose, glucagon (rDNA), dextrose  OBJECTIVE   CURRENT VITALS:  height is 5' 2\" (1.575 m) and weight is 113 lb 8.6 oz (51.5 kg). Her axillary temperature is 98.1 °F (36.7 °C). Her blood pressure is 127/58 (abnormal) and her pulse is 81. Her respiration is 18 and oxygen saturation is 95%.    Temperature Range (24h):Temp: 98.1 °F (36.7 °C) Temp  Av.6 °F (36.4 °C)  Min: 96.2 °F (35.7 °C)  Max: 98.2 °F (36.8 °C)  BP Range (04I): Systolic (70NER), JCB:963 , Min:127 , HPB:177     Diastolic (79FGV), YH, Min:58, Max:90    Pulse Range (24h): Pulse  Av.8  Min: 60  Max: 81  Respiration Range (24h): Resp  Av.7  Min: 16  Max: 18  Current Pulse Ox (24h):  SpO2: 95 %  Pulse Ox Range (24h):  SpO2  Av.6 %  Min: 93 %  Max: 95 %  Oxygen Amount and Delivery:    Incentive Spirometry Tx: Respiratory Effort: (encourage use)          GENERAL: alert, cooperative, no distress  SKIN: Skin color, texture, turgor normal. No rashes or lesions. HEENT: Head is normocephalic, atraumatic. NECK: Supple, symmetrical, trachea midline  LUNGS: clear to ausculation, without wheezes, rales or rhonci  HEART: normal rate and regular rhythm  ABDOMEN: soft, non-tender, non-distended, bowel sounds present  NEUROLOGIC: There are no focalizing motor or sensory deficits. CN II-XII are grossly intact. EXTREMITIES: no cyanosis, no clubbing and no edema. No intake/output data recorded. LABS     Recent Labs     19  0433 19  0803   WBC 11.9* 9.6 12.2*   HGB 10.3* 9.5* 9.6*   HCT 33.9* 31.0* 31.8*    221 223    143 142   K 4.4 4.6 4.0    107 108   CO2 27 25 22*   BUN 38* 42* 46*   CREATININE 1.4* 1.5* 1.6*   CALCIUM 9.2 9.3 9.2      No results for input(s): PTT, INR in the last 72 hours. Invalid input(s): PT  Recent Labs     19   AST 12   ALT <5*   BILITOT 0.4     Recent Labs     19  1711 19  0803 19  1443   TROPONINT < 0.010 0.032* 0.029*     RADIOLOGY     NUCLEAR MEDICINE HEPATOBILIARY SCAN WITH CCK       CLINICAL INFORMATION: Nausea, vomiting, abdominal cramping. Gallstones.       COMPARISON: No prior study.       TECHNIQUE:  The patient was injected with 9.7 mCi of technetium 99m mebrofenin.       FINDINGS:  Gamma camera images were obtained over the abdomen and demonstrated rapid uptake of the radionuclide by the hepatocytes.  The gallbladder begins to visualize by six minutes.  Mild delayed visualization of the gastrointestinal tract.     Gastrointestinal activity is seen at two hours.  The patient was administered 1.05 mcg Kinevac and using region of interest, the gallbladder ejection fraction was calculated.  The gallbladder ejection fraction is 69%.         Impression   Normal nuclear medicine hepatobiliary scan with a gallbladder ejection fraction of 69%.             **This report has been created using voice recognition software.  It may contain minor errors which are inherent in voice recognition technology. **       Final report electronically signed by Dr. Tyrone Foley on 3/21/2019 4:13 PM     Electronically signed by MONA Yanez CNP on 3/23/2019 at 7:34 AM     Patient seen and examined independently by me early this AM. Above discussed and I agree with Danny Haney CNP. See my additional comments below for updated orders and plan. Labs, cultures, and radiographs where available were reviewed. I discussed patient concerns with the patient's nurse and instructions were given. Please see our orders for the updated patient care plan. - Overnight events noted. No acute disease as gallbladder appears to be filling now given HIDA scan. Per nursing some abdominal pain/nausea overnight but improved this morning. Advancing diet. No abdominal pain/nausea later this morning. Replace electrolytes as needed per protocol. Discussions with family in regards to conservative treatment versus cholecystectomy. Trying conservative treatment plan unless disease worsens at this time. Appreciate hospitalist service assistance. Home when okay with medical service.     Electronically signed by Jonathon Steel MD on 3/23/2019 at 3:03 PM

## 2019-03-23 NOTE — PLAN OF CARE
Problem: Falls - Risk of:  Goal: Will remain free from falls  Description  Will remain free from falls  Outcome: Ongoing  Note:   Call light within reach. Side rails up x2. Bed alarm on. Non skid slippers available. Problem: Pain Control  Goal: Maintain pain level at or below patient's acceptable level (or 5 if patient is unable to determine acceptable level)  Outcome: Ongoing  Note:   Patient free from pain this shift. Pain rated on 0-10 pain rating scale. Will continue to reassess. Problem: Cardiovascular  Goal: No DVT, peripheral vascular complications  Outcome: Ongoing  Note:   Dr. Shantell Lewis will see tomorrow morning for elevated troponins. No complaints of chest pain or shortness of breath. Will continue to assess. Problem: GI  Goal: No bowel complications  Outcome: Ongoing  Note:   No abdominal pain today. Patient had BM with no issues. Will continue to monitor. Problem: Skin Integrity/Risk  Goal: No skin breakdown during hospitalization  Outcome: Ongoing  Note:   Patient free from skin breakdown. Patient turns self and makes frequent positional changes. Will continue to monitor. Problem: DISCHARGE BARRIERS  Goal: Patient's continuum of care needs are met  Outcome: Ongoing  Note:   Patient plans to be discharged to home with family when medically stable. Problem: Nutrition  Goal: Optimal nutrition therapy  Outcome: Ongoing  Note:   Patient on a general diet. Eating 50% of meals. Will continue to monitor intake and output. Care plan reviewed with patient and daughter. Patient and daughter verbalize understanding of the plan of care and contribute to goal setting.

## 2019-03-23 NOTE — FLOWSHEET NOTE
03/23/19 0405   Provider Notification   Reason for Communication Review case   Provider Name [facility]   Provider Notification Physician   Method of Communication Secure Message   Response See orders   Notification Time 0405   Pt Bp 176/88 and has Hydralazine PO order but pt refused to take. Also pt is very agitated and getting out of bed, screaming and grabbed a nurses badge screaming and refusing to return. Dr. Wiliam Walker was notified of the situation and ordered. IV hydralazine 4mg once and haldol IM 5mg once.

## 2019-03-24 NOTE — PROGRESS NOTES
Hospitalist Progress Note    Patient:  Kristy Patel      Unit/Bed:6K-23/023-A    YOB: 1929    MRN: [id number]       Acct: [de-identified]     PCP: Chauncey Saez DO    Date of Admission: 3/20/2019    Assessment/Plan:    1. Elevated troponin. No CP. Initial trop was negative. EKG with RBBB unchanged. Trend trop. Awaiting cardiology input  2. Cholelithiasis. General surgery managing. HIDA scan normal with EF 69%. No acute surgical plans. Outpatient follow up. 3. Paroxysmal Atrial fibrillation, currently in sinus rhythm, rate controlled, CHADsVasc score 5. - will leave further recommendation to cardiology  4. CKD stage 3, stable. Creatinine 1.6 (baseline creatinine 1.4 to 1.5). Renally dose medications. Avoid nephrotoxins  5. Pulmonary congestion noted on CXR 3/20/19- RESOLVED  6. Normocytic anemia, chronic, suspect anemia of chronic disease due to CKD3. Hgb today 9.6.  7. HTN,UNCONTROLLED -  Lisinopril and metoprolol. - ADJUSTED MEDS FOR BETTER CONTROL- ADDED NORVASC  8. DM type 2, controlled. A1C 3/2018 was 5.7%. Accu-checks and SSI  9. Leukocytosis, up to 12.2 today. IV Zosyn. 10. New onset dementia. Aricept. - NO ACUTE METABOLIC ENCEPHALOPATHY   11. Valvular disease with severe AS and TR.- CARDIOLOGY IS CONSULTED  12. Chronic diastolic heart failure. Expected discharge date:  Per primary team    Disposition: Per primary team.    Chief Complaint: preoperative eval    Hospital Course:       Please see H/P for details. In summary, this is a 80 y.o. Female, with PMH CKD stage 3, CAD, s/p CABG, DM type 2, Hyperlipidemia, HTN, who was admitted under Surgery service due to cholelithiasis with suspected acute cholecystitis. Hospital Medicine service was consulted for pre-op eval. Patient was seen by Dr. Jayme Costello on 3/21/19. Her initial EKG showed Afib, rate controlled ( compare to EKG in 5/6/2013, previous EKG showed sinus bradycardia, rate 53.  Repeat EKG on 3/21/2019 showed sinus bradycardia, rate 59. CXR on 3/20/19 showed pulmonary congestion. Pro-BNP on 3/20/19 was elevated at 8484. Echo was done, pending report.       CT abd/pelvis on 3/20/19 showed distended gallbladder with gallstones and pericholecystic fluid. Surgery ordered HIDA scan, which was normal.           Subjective (past 24 hours):   Does have some confusion on and off  However engages in a decent convo if oriented. Medications:  Reviewed    Infusion Medications    dextrose       Scheduled Medications    amLODIPine  10 mg Oral Daily    sodium chloride flush  10 mL Intravenous 2 times per day    famotidine (PEPCID) injection  20 mg Intravenous Daily    insulin lispro  0-6 Units Subcutaneous TID WC    insulin lispro  0-3 Units Subcutaneous Nightly    lisinopril  20 mg Oral Daily    calcium elemental  500 mg Oral Daily    vitamin D  2,000 Units Oral Daily    docusate sodium  100 mg Oral Nightly    donepezil  5 mg Oral Nightly    ferrous sulfate  650 mg Oral Daily    levothyroxine  88 mcg Oral Daily    metoprolol tartrate  50 mg Oral BID     PRN Meds: hydrALAZINE, sodium chloride flush, acetaminophen, magnesium hydroxide, ondansetron, glucose, dextrose, glucagon (rDNA), dextrose      Intake/Output Summary (Last 24 hours) at 3/24/2019 1806  Last data filed at 3/24/2019 1426  Gross per 24 hour   Intake 600 ml   Output --   Net 600 ml       Diet:  DIET GENERAL;  Dietary Nutrition Supplements: Diabetic Oral Supplement    Exam:  BP (!) 141/77   Pulse 71   Temp 97.8 °F (36.6 °C) (Oral)   Resp 18   Ht 5' 2\" (1.575 m)   Wt 113 lb 8.6 oz (51.5 kg)   LMP  (Exact Date)   SpO2 97%   BMI 20.77 kg/m²     General appearance: No apparent distress, appears stated age and cooperative. HEENT: Pupils equal, round, and reactive to light. Conjunctivae/corneas clear. Neck: Supple, with full range of motion. No jugular venous distention. Trachea midline. Respiratory:  Normal respiratory effort.  Clear to auscultation, bilaterally without Rales/Wheezes/Rhonchi. Cardiovascular: Regular rate and rhythm with normal S1/S2 + murmurs, rubs or gallops. Abdomen: Soft, non-tender, non-distended with normal bowel sounds. Musculoskeletal: passive and active ROM x 4 extremities. Skin: Skin color, texture, turgor normal.  No rashes or lesions. Neurologic:  Neurovascularly intact without any focal sensory/motor deficits. Cranial nerves: II-XII intact, grossly non-focal.  Psychiatric: Alert and oriented, thought content appropriate, normal insight  Capillary Refill: Brisk,< 3 seconds   Peripheral Pulses: +2 palpable, equal bilaterally       Labs:   Recent Labs     03/22/19  0803 03/23/19  0824   WBC 12.2* 11.5*   HGB 9.6* 9.5*   HCT 31.8* 30.6*    215     Recent Labs     03/22/19  0803 03/23/19  0824 03/24/19  1300    144 140   K 4.0 3.3* 3.5    111 105   CO2 22* 21* 23   BUN 46* 41* 39*   CREATININE 1.6* 1.6* 1.6*   CALCIUM 9.2 9.2 9.5     No results for input(s): AST, ALT, BILIDIR, BILITOT, ALKPHOS in the last 72 hours. No results for input(s): INR in the last 72 hours. No results for input(s): Simon Milch in the last 72 hours. Microbiology:      Urinalysis:      Lab Results   Component Value Date    NITRU NEGATIVE 03/21/2019    WBCUA 25-50 03/21/2019    WBCUA >200 02/15/2012    BACTERIA NONE 03/21/2019    RBCUA 5-10 03/21/2019    BLOODU TRACE 03/21/2019    SPECGRAV 1.025 12/23/2017    GLUCOSEU NEGATIVE 03/21/2019       Radiology:  RADIOLOGY REPORT   Final Result      XR CHEST PORTABLE   Final Result      Cardiomegaly with mildly progressive mild CHF. **This report has been created using voice recognition software. It may contain minor errors which are inherent in voice recognition technology. **      Final report electronically signed by Dr. Nick Renner on 3/22/2019 2:40 AM      NM HEPATOBILIARY SCAN W EJECTION FRACTION   Final Result   Normal nuclear medicine hepatobiliary scan with a gallbladder ejection fraction of 69%. **This report has been created using voice recognition software. It may contain minor errors which are inherent in voice recognition technology. **      Final report electronically signed by Dr. Nathanael Webster on 3/21/2019 4:13 PM      XR CHEST PORTABLE   Final Result   Prominence of the interstitium and cephalized pulmonary vessels suggesting pulmonary vascular congestion correlation advised               **This report has been created using voice recognition software. It may contain minor errors which are inherent in voice recognition technology. **      Final report electronically signed by Dr. Nicole Read on 3/20/2019 7:40 PM      CT ABDOMEN PELVIS W IV CONTRAST Additional Contrast? None   Final Result      1. Distended gallbladder with gallstones and pericholecystic fluid correlation with symptoms, serology and ultrasound is advised. 2. Advanced degenerative changes lumbar spine and pelvis with chronic compression fractures. 3. Moderate scattered stool in the colon with apparent thickening of the sigmoid colon which could represent underdistention versus mild colitis. 4. Advanced atherosclerotic changes aorta or branch vessels. 5. Small bilateral pleural effusions. **This report has been created using voice recognition software. It may contain minor errors which are inherent in voice recognition technology. **      Final report electronically signed by Dr. Nicole Read on 3/20/2019 7:16 PM          DVT prophylaxis: [] Lovenox                                 [] SCDs                                 [] SQ Heparin                                 [] Encourage ambulation           [] Already on Anticoagulation     Code Status: Full Code      Tele:   [] yes             [x] no    Active Hospital Problems    Diagnosis Date Noted    Moderate malnutrition ([facility] [address] [E44.0] 03/22/2019    Elevated troponin [R74.8]     Severe aortic stenosis [I35.0]     Gallbladder disease [K82.9]     Cholecystitis, acute [K81.0] 03/20/2019    Leukocytosis [D72.829] 03/20/2019    HTN (hypertension) [I10] 03/20/2019    Hypothyroidism [E03.9] 03/07/2018    Gastroesophageal reflux disease [K21.9] 02/20/2018    Chronic kidney disease [N18.9] 02/20/2018       Electronically signed by Miriam Lowe MD on 3/24/2019 at 6:06 PM

## 2019-03-24 NOTE — PROGRESS NOTES
[facility] - Dr. Vasiliy Abbott  Daily Progress Note    Pt Name: Katherine Holman  Medical Record Number: [id number]  Date of Birth 6/27/1929   Today's Date: 3/24/2019    Hospital day # 4     ASSESSMENT   1. Cholelithiasis with possible acute cholecystitis  2. Leukocytosis - improving   3. Hypertension  4. New onset Alzheimer's   5. Elevated troponin  6. History of atrial fibrillation  7. Chronic kidney insufficiency    has a past medical history of Arthritis, CAD (coronary artery disease), Cancer ([facility] [address] Diabetes mellitus ([facility] [address] GERD (gastroesophageal reflux disease), Hematuria, Hyperlipidemia, Hypertension, Hypothyroidism, Kidney atrophy, Other disorders of kidney and ureter, Thyroid disease, and UTI (urinary tract infection). PLAN   1. Hida scan normal. EF 69%. No evidence of acute cholecystitis. 2. Medicine following, but now wants cardiology to see prior to discharge for elevated trops. 3. Continue to monitor urine output. Kidney function slightly evaluated. Chest x-ray showed some mild CHF. 4. Tolerating diet pretty well   5. Denies any abdominal pain or nausea during assessment   6. No need for antibiotics   7. Abdomen remains benign. No surgical intervention at this time. Will follow outpatient for cholelithiasis. SUBJECTIVE   Chief complaint: No complaints     Patient was stable overnight. No confusion or agitation. Alert to name only this morning but pleasant. Chart reviewed. Updated by nursing staff. Patient appropriate this morning. Disoriented to the place and situation. Daughter present. Denies chest discomfort or dyspnea. Denies any abdominal pain or N/V. (+) belching, flatus. Having BMs. Denies any pain at all. Up with assistance.    CURRENT MEDICATIONS   Scheduled Meds:   piperacillin-tazobactam (ZOSYN) 3.375 g in dextrose 5% IVPB extended infusion (mini-bag)  3.375 g Intravenous Q12H    sodium chloride flush  10 mL Intravenous 2 times per day    famotidine (PEPCID) injection  20 mg Intravenous Daily    insulin lispro  0-6 Units Subcutaneous TID     insulin lispro  0-3 Units Subcutaneous Nightly    lisinopril  20 mg Oral Daily    calcium elemental  500 mg Oral Daily    vitamin D  2,000 Units Oral Daily    docusate sodium  100 mg Oral Nightly    donepezil  5 mg Oral Nightly    ferrous sulfate  650 mg Oral Daily    levothyroxine  88 mcg Oral Daily    metoprolol tartrate  50 mg Oral BID     Continuous Infusions:   dextrose       PRN Meds:.hydrALAZINE, sodium chloride flush, acetaminophen, magnesium hydroxide, ondansetron, glucose, dextrose, glucagon (rDNA), dextrose  OBJECTIVE   CURRENT VITALS:  height is 5' 2\" (1.575 m) and weight is 113 lb 8.6 oz (51.5 kg). Her oral temperature is 97.7 °F (36.5 °C). Her blood pressure is 180/74 (abnormal) and her pulse is 89. Her respiration is 16 and oxygen saturation is 97%. Temperature Range (24h):Temp: 97.7 °F (36.5 °C) Temp  Av °F (36.7 °C)  Min: 97.5 °F (36.4 °C)  Max: 98.5 °F (36.9 °C)  BP Range (57L): Systolic (82XTU), CMP:196 , Min:160 , ZCH:476     Diastolic (22SHT), YLP:44, Min:60, Max:88    Pulse Range (24h): Pulse  Av.4  Min: 70  Max: 89  Respiration Range (24h): Resp  Av.3  Min: 16  Max: 18  Current Pulse Ox (24h):  SpO2: 97 %  Pulse Ox Range (24h):  SpO2  Av %  Min: 96 %  Max: 98 %  Oxygen Amount and Delivery:    Incentive Spirometry Tx: Respiratory Effort: (encourage use)          GENERAL: alert, cooperative, no distress  SKIN: Skin color, texture, turgor normal. No rashes or lesions. HEENT: Head is normocephalic, atraumatic. NECK: Supple, symmetrical, trachea midline  LUNGS: clear to ausculation, without wheezes, rales or rhonci  HEART: normal rate and regular rhythm  ABDOMEN: soft, non-tender, non-distended, bowel sounds present  NEUROLOGIC: There are no focalizing motor or sensory deficits. CN II-XII are grossly intact. EXTREMITIES: no cyanosis, no clubbing and no edema.   In: 200 [P.O.:200]  Out: -   Date 03/24/19 0000 - 03/24/19 2359   Shift 3790-7990 0942-0514 2579-1858 24 Hour Total   INTAKE   P.O. 200   200   Shift Total(mL/kg) 200(3.9)   200(3.9)   OUTPUT   Shift Total(mL/kg)       Weight (kg) 51.5 51.5 51.5 51.5     LABS     Recent Labs     03/22/19  0803 03/23/19  0824   WBC 12.2* 11.5*   HGB 9.6* 9.5*   HCT 31.8* 30.6*    215    144   K 4.0 3.3*    111   CO2 22* 21*   BUN 46* 41*   CREATININE 1.6* 1.6*   CALCIUM 9.2 9.2      No results for input(s): PTT, INR in the last 72 hours. Invalid input(s): PT  No results for input(s): AST, ALT, BILITOT, BILIDIR, AMYLASE, LIPASE, LDH, LACTA in the last 72 hours. Recent Labs     03/22/19  0803 03/22/19  1443 03/23/19  0824   TROPONINT 0.032* 0.029* 0.046*     RADIOLOGY     NUCLEAR MEDICINE HEPATOBILIARY SCAN WITH CCK       CLINICAL INFORMATION: Nausea, vomiting, abdominal cramping. Gallstones.       COMPARISON: No prior study.       TECHNIQUE:  The patient was injected with 9.7 mCi of technetium 99m mebrofenin.       FINDINGS:  Gamma camera images were obtained over the abdomen and demonstrated rapid uptake of the radionuclide by the hepatocytes.  The gallbladder begins to visualize by six minutes.  Mild delayed visualization of the gastrointestinal tract. Gastrointestinal activity is seen at two hours.  The patient was administered 1.05 mcg Kinevac and using region of interest, the gallbladder ejection fraction was calculated.  The gallbladder ejection fraction is 69%.         Impression   Normal nuclear medicine hepatobiliary scan with a gallbladder ejection fraction of 69%.             **This report has been created using voice recognition software.  It may contain minor errors which are inherent in voice recognition technology. **       Final report electronically signed by Dr. Diomedes Rivas on 3/21/2019 4:13 PM     Electronically signed by MONA Bowen CNP on 3/24/2019 at 9:48 AM     Patient seen and examined independently by me earlier this AM. Above discussed and I agree with Jay Haney CNP. See my additional comments below for updated orders and plan. Labs, cultures, and radiographs where available were reviewed. I discussed patient concerns with the patient's nurse and instructions were given. Please see our orders for the updated patient care plan. - No acute gallbladder disease as gallbladder filling per HIDA scan. Abdomen benign today. Tolerating diet better. Monitoring gallbladder disease conservatively and if recurrent symptoms then may proceed with cholecystectomy. Okay for discharge from a surgical standpoint. Home when okay with medical consultants.     Electronically signed by Charity Mobley MD on 3/24/2019 at 11:38 AM

## 2019-03-25 NOTE — PROGRESS NOTES
Discharge teaching and instructions for diagnosis/procedure of cholecystitis completed with patient using teachback method. AVS reviewed. Printed prescriptions given to patient. Patient voiced understanding regarding prescriptions, follow up appointments, and care of self at home. Discharged ambulatory and in a wheelchair to  home with support and home health per family.

## 2019-03-25 NOTE — CARE COORDINATION
Plan discharge today once palliative care evaluates patient.   Electronically signed by Jc Senior RN on 3/25/2019 at 1:23 PM

## 2019-03-25 NOTE — CONSULTS
[address] [place] [address]                                  CONSULTATION    PATIENT NAME: Debra Alcala                  :        1929  MED REC NO:   [id number]                           ROOM:       [id number]  ACCOUNT NO:   [de-identified]                           ADMIT DATE: 2019  PROVIDER:     Lissette Alvarado. [facility]  [facility]    REASON FOR CONSULTATION:  Elevation of troponin. HISTORY OF PRESENT ILLNESS:  This is a patient who is an 80year-old  very sweet lady who is known to have a history of coronary artery  disease. She had a history of CABG about nine years ago. This patient  also had a history of valvular heart disease, moderate pulmonary  hypertension, mitral regurgitation, tricuspid regurgitation, and aortic  stenosis. She is a very frail lady. She is not a very good candidate  for any major invasive procedure. She was admitted under the care of  Dr. Tariq Morgan with an acute cholecystitis. The patient's family is  reluctant to have surgery. The patient's symptoms have improved with  conservative treatment. REVIEW OF SYSTEMS:  She has got shortness of breath with minimal  physical activity. She has a history of chronic renal insufficiency,  electrolyte imbalance. She had a history of diabetes mellitus. No  history of CVA. She had a history of hypothyroidism and hormonal  replacement. This patient has no change in her weight. She is very frail. She has  unsteady gait. She utilized a walker. The patient has a history of  chronic anemia. No active GI bleed. Diabetes mellitus. She had a  history of hypertension in the past, history of dyslipidemia. She had a  history of gastroesophageal reflux and esophageal strictures. The  patient has a history of uterine cancer.   The patient has cholelithiasis  and history of UTIs in the past.  The patient had a history of  arthritis, osteochondritis, and arthroscopy. She had a history of back  surgery on her cervical spine. The patient has no significant change in  her weight. She denied fevers, chills, or rigors. SOCIAL HISTORY:  She lives at her own home with her . Denies  smoking or alcohol abuse. ALLERGIES:  She has no known allergies. CODE STATUS:  Right now, she is a full code. PHYSICAL EXAMINATION:  VITAL SIGNS:  Showed her blood pressure now is 140/77. She was in sinus  rhythm. NEUROLOGIC:  She has no motor deficits. She was alert, oriented to  time, place, and self. NECK:  She has soft carotid bruit. Prominence of the jugular veins. No  thyromegaly. BACK:  She has scoliosis of the dorsal spine. She is very frail. LUNGS:  She has few basilar crackles. Scattered expiratory rhonchi. HEART:  She has a 3/6 systolic murmur over the aortic gradient of the  apex, radiates to the axilla. Parasternal heave was noted. ABDOMEN:  Soft. No tenderness. GENITAL/RECTAL:  Deferred. EXTREMITIES:  Lower limbs, there is no edema. LABORATORY/DIAGNOSTIC DATA:  Lab work shows her BUN is 39. Creatinine  1.6. Potassium now is 1.6. The EKG showed sinus rhythm with right bundle-branch block. No acute  changes. IMPRESSION:  1. The patient came with acute cholecystitis, treated medically. The  patient is currently reluctant to have surgery. She is doing good in  that aspect. 2.  Elevation of her troponin, probably combination of her GI problems  and her renal insufficiency; however, possibility of acute cardiac event  also cannot be excluded. 3.  The patient is known to have a history of coronary artery disease  and history of CABG. Dyspnea on exertion. 4.  Valvular heart disease with aortic stenosis, mitral regurgitation,  and tricuspid regurgitation. 5.  Moderate pulmonary hypertension. 6.  History of hypothyroidism, hormone replacement. 7.  Electrolyte imbalance.   8.  History of anemia, possible chronic disease. 9.  History of dyslipidemia. 10.  History of hypertension. The patient is in poor condition and very frail. At this point, from the cardiac standpoint, the patient is already on  beta blockers. She is on calcium channel blockers. She is having no  chest pain now. She is not a very good candidate for invasive  procedure. Recommend to continue the current medication. Recommend  palliative care. Consultation to discuss her code status. I believe  _____ the patient is to be DNR status. The patient will benefit from home health support. We thank you very much for allowing us to share in the management of  this patient. Should you have any questions, please do not hesitate to  call us. Ruby Valencia M.D.    D: 03/24/2019 19:54:23       T: 03/24/2019 23:02:43     [email or web address]  Job#: [id number]     Doc#: [id number]    CC:  Daniel Bloom M.D.        Referring Service

## 2019-03-25 NOTE — CARE COORDINATION
3/25/19, 3:22 PM    Discharge plan discussed by  and . Discharge plan reviewed with patient/ family. Patient/ family verbalize understanding of discharge plan and are in agreement with plan. Understanding was demonstrated using the teach back method.    Services After Discharge  Services At/After Discharge: OT, PT, Nursing Services, Aide Services   IMM Letter  IMM Letter given to Patient/Family/Significant other/Guardian/POA/by[de-identified] Raymond Maria CM   IMM Letter date given[de-identified] 03/25/19  IMM Letter time given[de-identified] [phone]

## 2019-03-25 NOTE — FLOWSHEET NOTE
03/25/19 1355   Encounter Summary   Services provided to: Patient and family together   Referral/Consult From: [facility];Family members; Religious/andrei community   [facility]   ([address] [place] [address])   Contact Religious Completed   Continue Visiting Yes  (3/25/19 )   Complexity of Encounter Moderate   Length of Encounter 15 minutes   Routine   Type Initial   Assessment Calm; Approachable   Intervention Active listening;Nurtured hope   Outcome Comfort;Expressed gratitude;Engaged in conversation   Spiritual/Jew   Type Spiritual support   During my contact with the patient, her son was supportively present. The pt was sitting comfortably in the chair and was about to eat. The pt was approachable and pleasant to talk with. The pt shared her past life experiences and her andrei journey. The pt has a strong andrei in God and has been thankful for [place]. The pt attends [address] [place] [address]. I offered words of comfort and emotional support. Plan: Continued support would be helpful. Plan; Continued support would be helpful.

## 2019-03-25 NOTE — PROGRESS NOTES
famotidine (PEPCID) injection  20 mg Intravenous Daily    insulin lispro  0-6 Units Subcutaneous TID WC    insulin lispro  0-3 Units Subcutaneous Nightly    lisinopril  20 mg Oral Daily    calcium elemental  500 mg Oral Daily    vitamin D  2,000 Units Oral Daily    docusate sodium  100 mg Oral Nightly    donepezil  5 mg Oral Nightly    ferrous sulfate  650 mg Oral Daily    levothyroxine  88 mcg Oral Daily    metoprolol tartrate  50 mg Oral BID     Continuous Infusions:   dextrose       PRN Meds:.hydrALAZINE, sodium chloride flush, acetaminophen, magnesium hydroxide, ondansetron, glucose, dextrose, glucagon (rDNA), dextrose  OBJECTIVE   CURRENT VITALS:  height is 5' 2\" (1.575 m) and weight is 103 lb 1.6 oz (46.8 kg). Her oral temperature is 98.3 °F (36.8 °C). Her blood pressure is 140/64 (abnormal) and her pulse is 70. Her respiration is 16 and oxygen saturation is 95%. Temperature Range (24h):Temp: 98.3 °F (36.8 °C) Temp  Av.9 °F (36.6 °C)  Min: 97.7 °F (36.5 °C)  Max: 98.3 °F (36.8 °C)  BP Range (91P): Systolic (08TEY), YGH:120 , Min:140 , QYW:918     Diastolic (26JQB), OKF:49, Min:64, Max:88    Pulse Range (24h): Pulse  Av.4  Min: 68  Max: 89  Respiration Range (24h): Resp  Av.4  Min: 16  Max: 19  Current Pulse Ox (24h):  SpO2: 95 %  Pulse Ox Range (24h):  SpO2  Av.6 %  Min: 95 %  Max: 98 %  Oxygen Amount and Delivery:    Incentive Spirometry Tx: Respiratory Effort: (encourage use)          GENERAL: alert, cooperative, no distress  SKIN: Skin color, texture, turgor normal. No rashes or lesions. HEENT: Head is normocephalic, atraumatic. NECK: Supple, symmetrical, trachea midline  LUNGS: clear to ausculation, without wheezes, rales or rhonci  HEART: normal rate and regular rhythm  ABDOMEN: soft, non-tender, non-distended, bowel sounds present  NEUROLOGIC: There are no focalizing motor or sensory deficits. CN II-XII are grossly intact.   EXTREMITIES: no cyanosis, no clubbing and no edema. In: 350 [P.O.:350]  Out: -   Date 03/25/19 0000 - 03/25/19 2359   Shift [id number] 3727-1621 [id number] 24 Hour Total   INTAKE   P.O. 100   100   I. V.(mL/kg/hr) 0   0   Shift Total(mL/kg) 100(2.1)   100(2.1)   OUTPUT   Shift Total(mL/kg)       Weight (kg) 46.8 46.8 46.8 46.8     LABS     Recent Labs     03/22/19  0803 03/23/19  0824 03/24/19  1300   WBC 12.2* 11.5*  --    HGB 9.6* 9.5*  --    HCT 31.8* 30.6*  --     215  --     144 140   K 4.0 3.3* 3.5    111 105   CO2 22* 21* 23   BUN 46* 41* 39*   CREATININE 1.6* 1.6* 1.6*   CALCIUM 9.2 9.2 9.5      No results for input(s): PTT, INR in the last 72 hours. Invalid input(s): PT  No results for input(s): AST, ALT, BILITOT, BILIDIR, AMYLASE, LIPASE, LDH, LACTA in the last 72 hours. Recent Labs     03/22/19  0803 03/22/19  1443 03/23/19  0824   TROPONINT 0.032* 0.029* 0.046*     RADIOLOGY     NUCLEAR MEDICINE HEPATOBILIARY SCAN WITH CCK       CLINICAL INFORMATION: Nausea, vomiting, abdominal cramping. Gallstones.       COMPARISON: No prior study.       TECHNIQUE:  The patient was injected with 9.7 mCi of technetium 99m mebrofenin.       FINDINGS:  Gamma camera images were obtained over the abdomen and demonstrated rapid uptake of the radionuclide by the hepatocytes.  The gallbladder begins to visualize by six minutes.  Mild delayed visualization of the gastrointestinal tract. Gastrointestinal activity is seen at two hours.  The patient was administered 1.05 mcg Kinevac and using region of interest, the gallbladder ejection fraction was calculated.  The gallbladder ejection fraction is 69%.         Impression   Normal nuclear medicine hepatobiliary scan with a gallbladder ejection fraction of 69%.             **This report has been created using voice recognition software.  It may contain minor errors which are inherent in voice recognition technology. **       Final report electronically signed by Dr. Seda Weir on 3/21/2019 4:13 PM Electronically signed by MONA Leon CNP on 3/25/2019 at 7:37 AM     Patient seen and examined independently by me early this AM. Above discussed and I agree with Jerry Owen CNP. See my additional comments below for updated orders and plan. Labs, cultures, and radiographs where available were reviewed. I discussed patient concerns with the patient's nurse and instructions were given. Please see our orders for the updated patient care plan. - Abdomen remains to be benign. No acute gallbladder disease. Gallbladder functioning. Appreciate medicine and cardiology assistance. Okay with discharge from cardiology. Palliative care. Monitoring kidney function closely. Tolerating diet. No need for antibiotics at this time. Home when okay with consultants and palliative care.     Electronically signed by Fern Hartley MD on 3/25/2019 at 11:10 AM

## 2019-03-25 NOTE — DISCHARGE SUMMARY
Kimberli He [facility]rew [address] [facility]       Pt Name: Matt Bates  MRN: [id number]  YOB: 1929  Primary Care Physician: Arthur Solis DO    Admit date:  3/20/2019  3:52 PM      Discharge date:  3/25/2019  4:55 PM    Admitting Diagnosis:   1. Gallbladder disease    2. Nausea and vomiting, intractability of vomiting not specified, unspecified vomiting type    3. Elevated brain natriuretic peptide (BNP) level      Discharge Diagnosis:   1. Gallbladder disease    2. Nausea and vomiting, intractability of vomiting not specified, unspecified vomiting type    3. Elevated brain natriuretic peptide (BNP) level      Admitting Service: General Surgery, Chino Ceballos MD.    Consultants:  Cardiology, Medicine, and palliative care    History and Physical:  Pt Name: Matt Bates  MRN: [id number]  YOB: 1929  Date of evaluation: 3/20/2019  Primary Care Physician: Arthur Solis DO  Patient evaluated at the request of  Thea Carver CNP  Reason for evaluation: Cramping ABD pain, Nausea, Vomiting  IMPRESSIONS:   1. Cholelithiasis with suspected acute cholecystitis   2. Leukocytosis   3.  has a past medical history of Arthritis, CAD (coronary artery disease), Cancer ([address] Diabetes mellitus ([address] GERD (gastroesophageal reflux disease), Hematuria, Hyperlipidemia, Hypertension, Hypothyroidism, Kidney atrophy, Other disorders of kidney and ureter, Thyroid disease, and UTI (urinary tract infection). RECOMMENDATIONS:   1. Bowel Rest Overnight  2. HIDA scan ordered for tomorrow AM  3. Hospitalist consult for surgical clearance and comorbidity management  4. Zosyn antibiotics   5. Pain and nausea control   6. Address home meds once reconciled   SUBJECTIVE:   History of Chief Complaint:    Jaya Hu is a 80 y. o.female with past medical history of CAD status post CABG, cervical cancer, diabetes type 2, hypertension, GERD, CKD, hypothyroidism, and hyperlipidemia who presents with history of nausea, vomiting, and abdominal cramping. Patient states that around noon today she developed severe nausea following breakfast that eventually led to emesis. She states she threw up multiple times but denies hematemesis. She states that the emesis was dark in color but she was drinking coffee prior to. Patient also complains of a right upper quadrant and diffuse abdominal cramping like pain. When asked when her last bowel movement was, patient states 2 days ago. According to notes, patient told other provider that her last bowel movement was yesterday. She does appear to be somewhat of an unreliable historian with mild underlying dementia. Patient denies any recent diarrhea or hematochezia. CT scan ordered by ED provider shows distended gallbladder with gallstones and mild pericholecystic fluid. Patient's white count is elevated at 11.9. Patient will be admitted under general surgery with consultation to hospitalist for surgical clearance. We will start patient on Zosyn antibiotics and HIDA scan has been ordered tomorrow a.m. for further evaluation. Patient may be added to surgical schedule tomorrow afternoon pending results.     Past Medical History   has a past medical history of Arthritis, CAD (coronary artery disease), Cancer ([address] Diabetes mellitus ([address] GERD (gastroesophageal reflux disease), Hematuria, Hyperlipidemia, Hypertension, Hypothyroidism, Kidney atrophy, Other disorders of kidney and ureter, Thyroid disease, and UTI (urinary tract infection). Past Surgical History   has a past surgical history that includes Stomach surgery; Coronary artery bypass graft; Coronary artery bypass graft; Knee arthroscopy (Left, 1983); Bunionectomy (Bilateral, 1993); Colonoscopy; back surgery (2008); Breast biopsy (Left, 1975); Knee arthroscopy (Right, 1991); Nose surgery (1996); Knee arthroscopy (Right, 2009);  Coronary artery bypass graft (2006); and other surgical history (5/23/2013). Medications  Home Medications           Prior to Admission medications    Medication Sig Start Date End Date Taking? Authorizing Provider   metoprolol tartrate (LOPRESSOR) 50 MG tablet Take 50 mg by mouth daily 3/18/19   Yes Historical Provider, MD   donepezil (ARICEPT) 5 MG tablet Take 1 tablet by mouth nightly 3/18/19   Yes Meg Rubinstein, DO   levothyroxine (SYNTHROID) 88 MCG tablet Take 1 tablet by mouth daily 3/18/19   Yes Meg Rubinstein, DO   Misc Natural Products (OSTEO BI-FLEX TRIPLE STRENGTH PO) Take by mouth     Yes Historical Provider, MD   benazepril (LOTENSIN) 20 MG tablet TAKE 1 TABLET BY MOUTH DAILY 12/17/18   Yes Meg Rubinstein, DO   Potassium 99 MG TABS Take 2 tablets by mouth daily     Yes Historical Provider, MD   ranitidine (ZANTAC) 150 MG tablet Take 150 mg by mouth nightly     Yes Historical Provider, MD   Calcium Carbonate (CALCIUM 600 PO) Take 2 tablets by mouth daily     Yes Historical Provider, MD   GLUCOSAMINE-CHONDROITIN PO Take 1 tablet by mouth 2 times daily     Yes Historical Provider, MD   Ferrous Sulfate (IRON) 325 (65 Fe) MG TABS Take 2 tablets by mouth daily     Yes Historical Provider, MD   Cholecalciferol (VITAMIN D3) 2000 units CAPS Take 1 capsule by mouth daily     Yes Historical Provider, MD   Docusate Calcium (STOOL SOFTENER PO) Take 2 tablets by mouth nightly     Yes Historical Provider, MD   Multiple Vitamin (DAILY VITAMIN FORMULA PO) Take 1 tablet by mouth daily.     Yes Historical Provider, MD       Scheduled Meds:  Continuous Infusions:  PRN Meds:. Allergies  has No Known Allergies. Family History  family history includes Arthritis in her mother; Asthma in her mother; Cancer in her father and mother; Hearing Loss in her father. Social History   reports that she quit smoking about 24 years ago. She has never used smokeless tobacco. She reports that she does not drink alcohol or use drugs.   Review of Systems  General Fatigue, Denies any fever or chills  HEENT Denies any diplopia, tinnitus or vertigo  Resp Denies any shortness of breath, cough or wheezing  Cardiac Denies any chest pain, palpitations, claudication or edema  GI Nausea, Emesis, Crampy ABD pain, Denies any melena, hematochezia, hematemesis or pyrosis   Denies any frequency, urgency, hesitancy or incontinence  Heme Denies bruising or bleeding easily  Endocrine Hypothyroid, DM Type II but not on medication   Neuro Denies any focal motor or sensory deficits  SUBJECTIVE:   CURRENT VITALS:  oral temperature is 97.5 °F (36.4 °C). Her blood pressure is 181/73 (abnormal) and her pulse is 69. Her respiration is 16 and oxygen saturation is 97%. There is no height or weight on file to calculate BMI. Temperature Range (24h):Temp: 97.5 °F (36.4 °C) Temp  Av.5 °F (36.4 °C)  Min: 97.5 °F (36.4 °C)  Max: 97.5 °F (36.4 °C)  BP Range (84I): Systolic (71XFV), ZKC:768 , Min:167 , RUN:998     Diastolic (92JJM), YPT:53, Min:73, Max:109     Pulse Range (24h): Pulse  Av.5  Min: 69  Max: 100  Respiration Range (24h): Resp  Av.3  Min: 16  Max: 20  Current Pulse Ox (24h):  SpO2: 97 %  Pulse Ox Range (24h):  SpO2  Av.8 %  Min: 95 %  Max: 97 %  Oxygen Amount and Delivery:    CONSTITUTIONAL: Alert and oriented times 3, no acute distress and cooperative to examination with proper mood and affect. SKIN: Skin color, texture, turgor normal. No rashes or lesions. LYMPH: no cervical nodes, no inguinal nodes  HEENT: Head is normocephalic, atraumatic. EOMI, PERRLA. NECK: Supple, symmetrical, trachea midline, no adenopathy, thyroid symmetric, not enlarged and no tenderness, skin normal.  CHEST/LUNGS: chest symmetric with normal A/P diameter, normal respiratory rate and rhythm, lungs clear to auscultation without wheezes, rales or rhonchi. No accessory muscle use.  Previous CABG scar noted   CARDIOVASCULAR: Heart sounds are normal.  Regular rate and rhythm without       Thank you for the interesting evaluation. Further recommendations to follow.     Electronically signed by Kj Akers PA-C on 3/20/2019 at 9:32 PM     Patient seen and examined independently by me yesterday evening. Above discussed and I agree with OSWALDO Wise. See my additional comments below for updated orders and plan. Labs, cultures, and radiographs where available were reviewed. I discussed patient concerns with the patient's nurse and instructions were given. Please see our orders for the updated patient care plan. - Admitted for further observation. Bowel rest.  IV antibiotics. IV fluid hydration. Repeat labs in a.m. Pain control as needed. Serial abdominal examinations. HIDA scan. Discuss the risks, benefits and alternatives to possible cholecystectomy versus conservative treatment plan. Medical clearance/risk stratification.     Electronically signed by Charity Mobley MD on 3/21/2019 at 6:38 AM     Procedures/Diagnostic Tests:    NUCLEAR MEDICINE HEPATOBILIARY SCAN WITH CCK       CLINICAL INFORMATION: Nausea, vomiting, abdominal cramping. Gallstones.       COMPARISON: No prior study.       TECHNIQUE:  The patient was injected with 9.7 mCi of technetium 99m mebrofenin.       FINDINGS:  Gamma camera images were obtained over the abdomen and demonstrated rapid uptake of the radionuclide by the hepatocytes.  The gallbladder begins to visualize by six minutes.  Mild delayed visualization of the gastrointestinal tract. Gastrointestinal activity is seen at two hours.  The patient was administered 1.05 mcg Kinevac and using region of interest, the gallbladder ejection fraction was calculated.  The gallbladder ejection fraction is 69%.         Impression   Normal nuclear medicine hepatobiliary scan with a gallbladder ejection fraction of 69%.                **This report has been created using voice recognition software.  It may contain minor errors which are inherent in voice recognition technology. **       Final report electronically signed by Dr. Riya Samaniego on 3/21/2019 4:13 PM     Transthoracic Echocardiography Report (TTE)     Demographics      Patient Name   [facility]  Gender               Female                  J      MR #           [id number]        Race                                                       Ethnicity      Account #     [de-identified]        Room Number          0050      Accession      [id number]        Date of Study        03/21/2019   Number      Date of Birth  06/27/1929       Referring Physician Nohemy Quiñonez DO      Age            89 year(s)       Sonographer         Adriana Zurita RDCS                                      Interpreting         Echo reader of the                                   Physician            sd Wen MD     Procedure    Type of Study      TTE procedure:ECHOCARDIOGRAM COMPLETE 2D W DOPPLER W COLOR.     Procedure Date  Date: 03/21/2019 Start: 01:57 PM    Study Location: Bedside  Technical Quality: Adequate visualization    Indications:Murmur and Elevated BNP.     Additional Medical History:Coronary artery disease, CABG, Former smoker,  CKD, GERD, Hypothyroid, Hypertension, Cervical cancer, Hyperlipidemia, CA  stenosis, Diabetes    Patient Status: Routine    Height: 61.81 inches Weight: 115 pounds BSA: 1.51 m^2 BMI: 21.16 kg/m^2    BP: 158/62 mmHg     Conclusions      Summary   Normal left ventricle size and systolic function.   Ejection fraction was estimated at 60 %.   There were no regional left ventricular wall motion abnormalities and wall   thickness was within normal limits.   Doppler parameters were consistent with a reversible restrictive pattern,   indicative of decreased left ventricular diastolic compliance and/or   increased left atrial pressure (grade 3 diastolic dysfunction).   The left atrium is Severely dilated.   Mildly enlarged right atrium size.   There is severe aortic stenosis with valve area of 0.9 sq cm- Low flow and   low Gradient.   The maximum aortic valve gradient is 32 mmHg, the mean gradient is 18   mmHg, and the peak velocity is 2.8 m/s.   Moderate to Severe mitral regurgitation is present.   Severe tricuspid regurgitation visualized.   Right ventricular systolic pressure measures 100 mmhg.      Signature      ----------------------------------------------------------------   Electronically signed by Shobha Jones MD (Interpreting   physician) on 03/21/2019 at 06:33 PM   ----------------------------------------------------------------    Hospital Course: Clifford Marshall is a 80-year old female patient who presented to the ED for abdominal pain, nausea and vomiting. CT scan revealed distended gallbladder with gallstones and pericholecystic fluid. She was admitted to [address] [place] [address] for care. Sidra Alpine scan was performed to rule out acute cholecystitis. Sidra Modesta was normal with EF 69%. Patient was advanced to regular diet. She was able to tolerate without any nausea, vomiting or abdominal pain. During the hospital stay patient developed elevated troponins and ECHO was performed. Cardiology was asked to see the patient, but no changes were made. No surgical intervention during this stay.  She was discharged home with daughter and family assist.     Discharge Condition: stable    Disposition: home with family    Labs:  Lab Results   Component Value Date    WBC 11.5 (H) 03/23/2019    HGB 9.5 (L) 03/23/2019    HCT 30.6 (L) 03/23/2019    MCV 96.8 03/23/2019     03/23/2019     Lab Results   Component Value Date     03/24/2019    K 3.5 03/24/2019    K 4.6 03/21/2019     03/24/2019    CO2 23 03/24/2019    BUN 39 03/24/2019    CREATININE 1.6 03/24/2019    GLUCOSE 191 03/24/2019    CALCIUM 9.5 03/24/2019      Lab Results   Component Value Date    ALKPHOS 60 03/20/2019    ALT <5 03/20/2019    AST 12 03/20/2019    PROT 6.3 03/20/2019    BILITOT 0.4 03/20/2019    BILIDIR 0.1 11/27/2012    LABALBU 3.3 03/20/2019    LABALBU 4.4 02/15/2012       Discharge Medications:        Medication List      START taking these medications    amLODIPine 10 MG tablet  Commonly known as:  NORVASC  Take 1 tablet by mouth daily  Start taking on:  3/26/2019        CONTINUE taking these medications    benazepril 20 MG tablet  Commonly known as:  LOTENSIN  TAKE 1 TABLET BY MOUTH DAILY     CALCIUM 600 PO     DAILY VITAMIN FORMULA PO     donepezil 5 MG tablet  Commonly known as:  ARICEPT  Take 1 tablet by mouth nightly     GLUCOSAMINE-CHONDROITIN PO     Iron 325 (65 Fe) MG Tabs     levothyroxine 88 MCG tablet  Commonly known as:  SYNTHROID  Take 1 tablet by mouth daily     metoprolol tartrate 50 MG tablet  Commonly known as:  LOPRESSOR     OSTEO BI-FLEX TRIPLE STRENGTH PO     Potassium 99 MG Tabs     STOOL SOFTENER PO     Vitamin D3 2000 units Caps     ZANTAC 150 MG tablet  Generic drug:  ranitidine           Where to Get Your Medications      These medications were sent to [address] [place] [address] [place], [place] - [address] - P [phone] - F [phone]  . Gregory Lazaro [address] [place] [id number]    Hours:  24-hours Phone:  [phone]   · amLODIPine 10 MG tablet         Diet: Low fat diet as tolerated    Activity: activity as tolerated    Follow-up:  in 10 days with Fabien Kenny DO  Follow up: in 2 weeks with Dr. Kalpesh Tran, CNP   Electronincally signed 3/25/2019 at 7:39 PM    A total of 35 minutes was spent in preparing the patient for discharge with greater than 50% of the time involved with education, counseling and coordinating care

## 2019-03-25 NOTE — PLAN OF CARE
Problem: Falls - Risk of:  Goal: Will remain free from falls  Description  Will remain free from falls  Outcome: Ongoing  Note:   Pt has been free of falls this shift. Tele sitter still in place. Bed is in low position, 2/4 rails are up and alarm is active. Call light is in reach, non-slip socks are on and hourly rounding performed. Goal: Absence of physical injury  Description  Absence of physical injury  Outcome: Ongoing     Problem: Pain Control  Goal: Maintain pain level at or below patient's acceptable level (or 5 if patient is unable to determine acceptable level)  Outcome: Ongoing  Note:   The patient has been free of pain this shift. Continue to assess and monitor. Problem: Cardiovascular  Goal: No DVT, peripheral vascular complications  Outcome: Ongoing  Note:   Dr. Charly Rodriguez evaluated patient last night for elevated trops. Consulted palliative care and possible discharge. Problem: GI  Goal: No bowel complications  Outcome: Ongoing  Goal: Bowel movement at least every other day  Outcome: Ongoing     Problem: Skin Integrity/Risk  Goal: No skin breakdown during hospitalization  Outcome: Ongoing  Goal: Wound healing  Outcome: Ongoing     Problem: DISCHARGE BARRIERS  Goal: Patient's continuum of care needs are met  Outcome: Ongoing  Note:   Palliative care consulted. Possible discharge later on today. Problem: Nutrition  Goal: Optimal nutrition therapy  Outcome: Ongoing   Care plan reviewed with patient. The patient. Contributes and verbalizes understanding with plan of care.

## 2019-04-01 NOTE — PROGRESS NOTES
Visit Information    Have you changed or started any medications since your last visit including any over-the-counter medicines, vitamins, or herbal medicines? no   Are you having any side effects from any of your medications? -  no  Have you stopped taking any of your medications? Is so, why? -  no    Have you seen any other physician or provider since your last visit? Yes - Records Obtained  Have you had any other diagnostic tests since your last visit? Yes - Records Obtained  Have you been seen in the emergency room and/or had an admission to a hospital since we last saw you? No  Have you had your routine dental cleaning in the past 6 months? n/a    Have you activated your [employer] account? If not, what are your barriers?  No: declines     Patient Care Team:  Dann Harada, DO as PCP - General (Family Medicine)  Dann Harada, DO as PCP - [facility] Attributed Provider  Michael Gordon MD as Physician (Cardiology)  Giovanny Small MD as Physician (Nephrology)  Florentino Murray, MIKAYLA as Care Transition  Manuelito Polk, RN as Care Coordinator  Micki Kwan RN as Care Transition  Nirav Stern RN as Care Transition  Rafaela Osler, MD as Surgeon (General Surgery)    Medical History Review  Past Medical, Family, and Social History reviewed and does contribute to the patient presenting condition    Health Maintenance   Topic Date Due    DTaP/Tdap/Td vaccine (1 - Tdap) 06[birth year]27/1948    Shingles Vaccine (1 of 2) 06/27/1979    Flu vaccine (Season Ended) 09/01/2019    Pneumococcal 65+ years Vaccine (2 of 2 - PPSV23) 09/19/2019    TSH testing  03/12/2020    Potassium monitoring  03/24/2020    Creatinine monitoring  03/24/2020

## 2019-04-01 NOTE — PROGRESS NOTES
 Misc Natural Products (OSTEO BI-FLEX TRIPLE STRENGTH PO) Take by mouth      benazepril (LOTENSIN) 20 MG tablet TAKE 1 TABLET BY MOUTH DAILY 90 tablet 11    Potassium 99 MG TABS Take 1 tablet by mouth 2 times daily       ranitidine (ZANTAC) 150 MG tablet Take 75 mg by mouth 2 times daily       Calcium Carbonate (CALCIUM 600 PO) Take 1 tablet by mouth daily       GLUCOSAMINE-CHONDROITIN PO Take 1 tablet by mouth 2 times daily      Ferrous Sulfate (IRON) 325 (65 Fe) MG TABS Take 2 tablets by mouth daily      Cholecalciferol (VITAMIN D3) 2000 units CAPS Take 1 capsule by mouth daily      Docusate Calcium (STOOL SOFTENER PO) Take 2 tablets by mouth nightly      Multiple Vitamin (DAILY VITAMIN FORMULA PO) Take 1 tablet by mouth daily. Vitals:    04/01/19 1009 04/01/19 1028   BP: (!) 144/60 (!) 140/60   Site: Left Upper Arm Left Upper Arm   Position: Sitting Sitting   Cuff Size: Medium Adult Medium Adult   Pulse: 64    Resp: 20    Weight: 107 lb 4.8 oz (48.7 kg)      Body mass index is 19.63 kg/m². Wt Readings from Last 3 Encounters:   04/01/19 107 lb 4.8 oz (48.7 kg)   03/25/19 103 lb 1.6 oz (46.8 kg)   03/18/19 103 lb 14.4 oz (47.1 kg)     BP Readings from Last 3 Encounters:   04/01/19 (!) 140/60   03/25/19 (!) 168/72   03/18/19 (!) 142/82        Patient was admitted to [address] [place] [address] from 3/20/19 to 3/25/19 for GB disease. Inpatient course: Discharge summary reviewed- see chart. Current status: improving    Review of Systems:  A comprehensive review of systems was negative except for what was noted in the HPI.     Physical Exam:  General Appearance: alert and oriented to person, place and time, well developed and well- nourished, in no acute distress  Skin: warm and dry, no rash or erythema  Head: normocephalic and atraumatic  Eyes: pupils equal, round, and reactive to light, extraocular eye movements intact, conjunctivae normal  ENT: tympanic membrane, external ear and ear malnutrition ([facility]  Assessment and plan:  Stable based upon symptoms and exam. Continue current treatment plan and follow up at least yearly. Visit Dx:  N18.3 - Chronic kidney disease, stage III (moderate) ([facility]  Assessment and plan:  Stable based upon symptoms and exam. Continue current treatment plan and follow up at least yearly.   Last edited 04/02/19 12:41 EDT by Swapnil Arias DO

## 2019-04-08 NOTE — CARE COORDINATION
[facility] Transitions Follow Up Call    2019    Patient: Marylin Espinosa  Patient : 1929   MRN: [id number]  Reason for Admission: No admission diagnoses are documented for this encounter. Discharge Date: 3/25/19 RARS: Readmission Risk Score: 18    Spoke with: Clay Richardson for final Care Transition follow up. Identified self/role. Care Transitions Subsequent and Final Call    Subsequent and Final Calls  Do you have any ongoing symptoms?:  Yes  Patient-reported symptoms:  Weakness  Have your medications changed?:  No  Do you have any questions related to your medications?:  No  Do you currently have any active services?:  Yes  Are you currently active with any services?:  Home Health  Do you have any needs or concerns that I can assist you with?:  No  Identified Barriers:  None  Care Transitions Interventions     Other Services:  Completed (Comment: [facility] Referral)     Social Work:  Completed    Other Interventions:          Patient states she is doing well. Patient denies chest pain, abdominal pain, fever, chills, and N/V/D. Patient states glucose and blood pressure WNL. Patient is current with [facility]. Patient states dizziness episodes have improved, stating she is trying to take her time when getting out of bed. Encouraged patient to change positions slowly, verbalized understanding. Patient instructed when to report to ED for continued dizziness or syncopal episodes. Patient denies additional needs or concerns at this time. Patient instructed to notify Pre-Service or CTC for any new or worsening symptoms, contact information provided. Patient verbalized understanding. CTC will continue to follow.       [facility] referral for HTN and medication compliance - Routed.     Follow Up  Future Appointments   Date Time Provider Blas Billings   4/10/2019 11:30 AM Leigh Pierce MD Adv Surg [facility] [place] [facility]   2019 10:30 AM DO Anu Childress, RN  Care

## 2019-04-10 NOTE — CARE COORDINATION
or health crisis. :  Independent  Ability to ambulate to restroom:  Independent  Ability handle personal hygeine needs (bathing/dressing/grooming):  Needs Assistance  Ability to manage Medications:  Needs Assistance  Ability to prepare Food Preparation:  Needs Assistance  Ability to maintain home (clean home, laundry):  Needs Assistance  Ability to drive and/or has transportation:  Needs Assistance  Ability to do shopping:  Needs Assistance  Ability to manage finances:  Needs Assistance  Is patient able to live independently?:  No     Current Housing:  Private Residence        Per the Fall Risk Screening, did the patient have 2 or more falls or 1 fall with injury in the past year?:  Yes  How often do you think you are about to fall and you do NOT fall? For example, you grab something to stabilize yourself or hold onto a wall/furniture?:  Frequently  Use of a Mobility Aid:  Yes  Difficulty walking/impaired gait:  No  Issues with feet or shoes like numbness, edema, shoes not fitting:  No  Changes in vision, poor vision or poor lighting in environment:  No  Dizziness:  No  Other Fall Risk:  No     Frequent urination at night?:  No  Do you use rails/bars?:  Yes  Do you have a non-slip tub mat?:  Yes     Are you experiencing loss of meaning?:  No  Are you experiencing loss of hope and peace?:  No     Thinking about your patient's physical health needs, are there any symptoms or problems (risk indicators) you are unsure about that require further investigation?:  No identified areas of uncertainly or problems already being investigated   Are the patients physical health problems impacting on their mental well-being?:  No identified areas of concern   Are there any problems with your patients lifestyle behaviors (alcohol, drugs, diet, exercise) that are impacting on physical or mental well-being?:  No identified areas of concern   Do you have any other concerns about your patients mental well-being?  How would you rate their severity and impact on the patient?:  No identified areas of concern   How would you rate their home environment in terms of safety and stability (including domestic violence, insecure housing, neighbor harassment)?:  Consistently safe, supportive, stable, no identified problems   How do daily activities impact on the patient's well-being? (include current or anticipated unemployment, work, caregiving, access to transportation or other):  No identified problems or perceived positive benefits   How would you rate their social network (family, work, friends)?:  Adequate participation with social networks   How would you rate their financial resources (including ability to afford all required medical care)?:  Financially secure, resources adequate, no identified problems   How wells does the patient now understand their health and well-being (symptoms, signs or risk factors) and what they need to do to manage their health?:  Reasonable to good understanding and already engages in managing health or is willing to undertake better management   How well do you think your patient can engage in healthcare discussions? (Barriers include language, deafness, aphasia, alcohol or drug problems, learning difficulties, concentration):  Clear and open communication, no identified barriers   Do other services need to be involved to help this patient?:  Other care/services in place and adequate   Are current services involved with this patient well-coordinated? (Include coordination with other services you are now recommendation):   All required care/services in place and well-coordinated   Suggested Interventions and [place]:  Completed (Comment: [facility])   Other Services or Interventions:  has  come in 4x wk.  family does outdoor work   [employer] or [place]:  Completed   Occupational Therapy:  Completed   Physical Therapy:  Completed   Transportation Services:  Completed Zone Management Tools:  Not Started         Set up/Review an Education Plan, Set up/Review Goals              Prior to Admission medications    Medication Sig Start Date End Date Taking? Authorizing Provider   amLODIPine (NORVASC) 10 MG tablet Take 1 tablet by mouth daily 3/26/19   Juan Helm MD   metoprolol tartrate (LOPRESSOR) 50 MG tablet Take 50 mg by mouth daily  3/18/19   Historical Provider, MD   donepezil (ARICEPT) 5 MG tablet Take 1 tablet by mouth nightly 3/18/19   Basilia Mota DO   levothyroxine (SYNTHROID) 88 MCG tablet Take 1 tablet by mouth daily  Patient taking differently: Take 100 mcg by mouth daily  3/18/19   Basilia Mota DO   Misc Natural Products (OSTEO BI-FLEX TRIPLE STRENGTH PO) Take by mouth    Historical Provider, MD   benazepril (LOTENSIN) 20 MG tablet TAKE 1 TABLET BY MOUTH DAILY 12/17/18   Basilia Mota DO   Potassium 99 MG TABS Take 1 tablet by mouth 2 times daily     Historical Provider, MD   ranitidine (ZANTAC) 150 MG tablet Take 75 mg by mouth 2 times daily     Historical Provider, MD   Calcium Carbonate (CALCIUM 600 PO) Take 1 tablet by mouth daily     Historical Provider, MD   GLUCOSAMINE-CHONDROITIN PO Take 1 tablet by mouth 2 times daily    Historical Provider, MD   Ferrous Sulfate (IRON) 325 (65 Fe) MG TABS Take 2 tablets by mouth daily    Historical Provider, MD   Cholecalciferol (VITAMIN D3) 2000 units CAPS Take 1 capsule by mouth daily    Historical Provider, MD   Docusate Calcium (STOOL SOFTENER PO) Take 2 tablets by mouth nightly    Historical Provider, MD   Multiple Vitamin (DAILY VITAMIN FORMULA PO) Take 1 tablet by mouth daily.     Historical Provider, MD       Future Appointments   Date Time Provider Blas Billings   6/17/2019 10:30 AM Basilia Mota DO [facility]

## 2019-04-16 NOTE — CARE COORDINATION
I spoke with Teena at [facility].TEJAS Con. Patient remains at [facility].CHEKOERTNORMA Con. Patient is working toward becoming stronger and more stable. No discharge plans at this time. I will continue to follow until discharged.

## 2019-04-20 NOTE — PROGRESS NOTES
Subjective:      Patient ID: Miekl Gillis is a 80 y.o. female. Chief Complaint   Patient presents with    Follow-Up from Hospital     Discharged from [facility] 3/25/19-Cholelithiasis with suspected acute cholecystitis-patient was seen in the hospital     HPI  Denise Herrmann is an 66-year-old female who presents for follow-up with her daughter secondary to recent hospitalization in March due to cholelithiasis with suspected acute cholecystitis. During hospital stay she clinically improved and had a benign abdomen. HIDA scan demonstrated ejection fraction of 69% with no complete obstruction. Patient has known dementia/Alzheimer's. Family wishing not to proceed with surgery unless absolutely needed. Since discharge she has had no nausea or vomiting. No abdominal pain/tenderness. Tolerating diet. Regular bowel function. No urinary complaints. No hematochezia or melena. No fever, chills or sweats. Daughter states patient has been doing really well. Patient appears to agree. Review of Systems   Constitutional: Positive for fatigue. Negative for activity change, appetite change, chills, diaphoresis, fever and unexpected weight change. HENT: Negative for congestion, dental problem, drooling, ear discharge, ear pain, facial swelling, hearing loss, mouth sores, nosebleeds, postnasal drip, rhinorrhea, sinus pressure, sneezing, sore throat, tinnitus, trouble swallowing and voice change. Eyes: Negative for photophobia, pain, discharge, redness, itching and visual disturbance. Respiratory: Positive for shortness of breath. Negative for apnea, cough, choking, chest tightness, wheezing and stridor. Cardiovascular: Positive for leg swelling. Negative for chest pain and palpitations. Gastrointestinal: Negative for abdominal distention, abdominal pain, anal bleeding, blood in stool, constipation, diarrhea, nausea, rectal pain and vomiting.    Genitourinary: Negative for decreased urine volume, difficulty urinating, dyspareunia, dysuria, enuresis, flank pain, frequency, genital sores, hematuria, menstrual problem, pelvic pain, urgency, vaginal bleeding, vaginal discharge and vaginal pain. Musculoskeletal: Negative for arthralgias, back pain, gait problem, joint swelling, myalgias, neck pain and neck stiffness. Skin: Negative for color change, pallor, rash and wound. Neurological: Positive for weakness. Negative for dizziness, tremors, seizures, syncope, facial asymmetry, speech difficulty, light-headedness, numbness and headaches. Hematological: Negative for adenopathy. Bruises/bleeds easily. Psychiatric/Behavioral: Negative for agitation, behavioral problems, confusion, decreased concentration, dysphoric mood, hallucinations, self-injury, sleep disturbance and suicidal ideas. The patient is not nervous/anxious and is not hyperactive.       Past Medical History:   Diagnosis Date    Arthritis     CAD (coronary artery disease)     Cancer [facility]     uterine    Diabetes mellitus ([facility]     Gallstones     GERD (gastroesophageal reflux disease)     Hematuria     Hyperlipidemia     Hypertension     Hypothyroidism     Kidney atrophy     left kidney    Other disorders of kidney and ureter     Thyroid disease     TIA (transient ischemic attack)     UTI (urinary tract infection)        Past Surgical History:   Procedure Laterality Date    BACK SURGERY  2008    cervical    BREAST BIOPSY Left 1975    benign [place]    BUNIONECTOMY Bilateral 1993    [facility]    COLONOSCOPY      CORONARY ARTERY BYPASS GRAFT      CORONARY ARTERY BYPASS GRAFT      CORONARY ARTERY BYPASS GRAFT  2006    [address] ARTHROSCOPY Right 1991    [address] [facility],  [address] ARTHROSCOPY Right 2009    [facility]    NOSE SURGERY  1996    nasal polyp [address] OTHER SURGICAL HISTORY  5/23/2013    L3-5 Decompression L4-5 Posterior Spinal Fusion (Dr. Ericka Au, [facility])   Jorge [place] STOMACH SURGERY      scope       Current Outpatient Medications   Medication Sig Dispense Refill    amLODIPine (NORVASC) 10 MG tablet Take 1 tablet by mouth daily 30 tablet 3    metoprolol tartrate (LOPRESSOR) 50 MG tablet Take 50 mg by mouth daily   4    donepezil (ARICEPT) 5 MG tablet Take 1 tablet by mouth nightly 30 tablet 0    levothyroxine (SYNTHROID) 88 MCG tablet Take 1 tablet by mouth daily (Patient taking differently: Take 100 mcg by mouth daily ) 30 tablet 11    Misc Natural Products (OSTEO BI-FLEX TRIPLE STRENGTH PO) Take by mouth      benazepril (LOTENSIN) 20 MG tablet TAKE 1 TABLET BY MOUTH DAILY 90 tablet 11    Potassium 99 MG TABS Take 1 tablet by mouth 2 times daily       ranitidine (ZANTAC) 150 MG tablet Take 75 mg by mouth 2 times daily       Calcium Carbonate (CALCIUM 600 PO) Take 1 tablet by mouth daily       GLUCOSAMINE-CHONDROITIN PO Take 1 tablet by mouth 2 times daily      Ferrous Sulfate (IRON) 325 (65 Fe) MG TABS Take 2 tablets by mouth daily      Cholecalciferol (VITAMIN D3) 2000 units CAPS Take 1 capsule by mouth daily      Docusate Calcium (STOOL SOFTENER PO) Take 2 tablets by mouth nightly      Multiple Vitamin (DAILY VITAMIN FORMULA PO) Take 1 tablet by mouth daily. No current facility-administered medications for this visit.         No Known Allergies    Family History   Problem Relation Age of Onset    Cancer Mother     Arthritis Mother     Asthma Mother     Cancer Father     Hearing Loss Father        Social History     Socioeconomic History    Marital status:      Spouse name: Not on file    Number of children: Not on file    Years of education: Not on file    Highest education level: Not on file   Occupational History    Not on file   Social Needs    Financial resource strain: Not on file    Food insecurity:     Worry: Not on file     Inability: Not on file    Transportation needs:     Medical: Not on file     Non-medical: Not on file is no rebound and no guarding. Musculoskeletal: Normal range of motion. She exhibits no edema or tenderness. Neurological: She is alert and oriented to person, place, and time. No cranial nerve deficit. Skin: Skin is warm and dry. No rash noted. She is not diaphoretic. No erythema. No pallor. Psychiatric: She has a normal mood and affect. Her behavior is normal. Judgment and thought content normal.   Vitals reviewed. Lab Results   Component Value Date     03/24/2019    K 3.5 03/24/2019     03/24/2019    CO2 23 03/24/2019     Lab Results   Component Value Date    CREATININE 1.6 (H) 03/24/2019     Lab Results   Component Value Date    WBC 11.5 (H) 03/23/2019    HGB 9.5 (L) 03/23/2019    HCT 30.6 (L) 03/23/2019    MCV 96.8 03/23/2019     03/23/2019     Imaging -   Narrative   NUCLEAR MEDICINE HEPATOBILIARY SCAN WITH CCK       CLINICAL INFORMATION: Nausea, vomiting, abdominal cramping. Gallstones.       COMPARISON: No prior study.       TECHNIQUE:  The patient was injected with 9.7 mCi of technetium 99m mebrofenin.       FINDINGS:  Gamma camera images were obtained over the abdomen and demonstrated rapid uptake of the radionuclide by the hepatocytes.  The gallbladder begins to visualize by six minutes.  Mild delayed visualization of the gastrointestinal tract. Gastrointestinal activity is seen at two hours.  The patient was administered 1.05 mcg Kinevac and using region of interest, the gallbladder ejection fraction was calculated.  The gallbladder ejection fraction is 69%.         Impression   Normal nuclear medicine hepatobiliary scan with a gallbladder ejection fraction of 69%.             **This report has been created using voice recognition software.  It may contain minor errors which are inherent in voice recognition technology. **       Final report electronically signed by Dr. Tre Perez on 3/21/2019 4:13 PM     Narrative   PROCEDURE: CT ABDOMEN PELVIS W IV CONTRAST     atherosclerotic changes aorta or branch vessels. 5. Small bilateral pleural effusions.           **This report has been created using voice recognition software. It may contain minor errors which are inherent in voice recognition technology. **       Final report electronically signed by Dr. Stacy Salinas on 3/20/2019 7:16 PM       Patient Active Problem List   Diagnosis    Cervical cancer ([facility]    Carotid stenosis, asymptomatic    Aortic ectasia, abdominal (HCC)    Other hyperlipidemia    Osteoporosis    Chronic kidney disease    Idiopathic gout    Gastroesophageal reflux disease    Hypothyroidism    Cholecystitis, acute    Leukocytosis    HTN (hypertension)    Gallbladder disease    Elevated troponin    Severe aortic stenosis    Moderate malnutrition ([facility]     Assessment:      1. Acute calculus cholecystitis  2. Dementia/Alzheimer's      Plan:      1. Tolerating diet. Normal bowel function. Abdomen benign. Discussed with patient and daughter again about the pros and cons of surgical intervention versus conservative treatment plan. Daughter still wishes to proceed with conservative treatment which I agree with. If symptoms recur and she becomes more symptomatic then likely proceed with cholecystectomy. Daughter agrees with plan. 2.  Dietary modifications/restrictions discussed with patient. 3.  Follow-up with PCP as directed. 4.  Follow-up as needed. 5.  Signs and symptoms reviewed with patient that would be concerning and need her to return to office for re-evaluation. Patient states She will call if She has questions or concerns.         Shania Solorzano MD

## 2019-04-24 NOTE — CARE COORDINATION
Care Transition Discharge from [facility] Follow Up     Call within 2 business days of discharge: No    Non-face-to-face services provided:          Spoke with Ketan Stafford Prince/Spouse    Discharged From: [facility]    Date of discharge:     Called patient for a follow up transition call. Spouse and caregiver stated patient is doing fine. She was currently in the shower. Per chart patient schedule for f/u appointment 4/26/19. Will attempt to contact after visit.  ANALISA PopeN RN  Care Transition Coordinator  [phone]      Follow up appointments:    Future Appointments   Date Time Provider Blas Billings   4/26/2019 10:15 AM Jordana Aquino DO [address]   6/17/2019 10:30 AM Jordana Aquino DO [address]

## 2019-04-26 NOTE — PROGRESS NOTES
Subjective:      Patient ID: Angela Marte is a 80 y.o. female. HPI:    Chief Complaint   Patient presents with    Other     Patient was discharged from [facility] to home on 4/19/2019     Pt here for follow up from [facility]. She is much improved, still getting home PT, just working with strength in the legs. While in [place], was managed by Dr. Billy Chamorro. Per pt, had labs done. Thyroid medication was adjusted per daughter. Uncertain about any other labs. She is down 6 lbs since last visit. Her appetite is ok. Has changed diet due GB issues. Wt Readings from Last 3 Encounters:   04/26/19 101 lb 9.6 oz (46.1 kg)   04/10/19 107 lb (48.5 kg)   04/01/19 107 lb 4.8 oz (48.7 kg)     BP well controlled.   BP Readings from Last 3 Encounters:   04/26/19 132/64   04/10/19 (!) 116/50   04/01/19 (!) 140/60       Patient Active Problem List   Diagnosis    Cervical cancer ([address]    Carotid stenosis, asymptomatic    Aortic ectasia, abdominal (HCC)    Other hyperlipidemia    Osteoporosis    Chronic kidney disease    Idiopathic gout    Gastroesophageal reflux disease    Hypothyroidism    Cholecystitis, acute    Leukocytosis    HTN (hypertension)    Gallbladder disease    Severe aortic stenosis    Moderate malnutrition ([address]     Past Surgical History:   Procedure Laterality Date    BACK SURGERY  2008    cervical    BREAST BIOPSY Left 1975    benign [facility]    BUNIONECTOMY Bilateral 1993    [facility]    COLONOSCOPY      CORONARY ARTERY BYPASS GRAFT      CORONARY ARTERY BYPASS GRAFT      CORONARY ARTERY BYPASS GRAFT  2006    3 vessel [address]ela RUBIO/ [address] ARTHROSCOPY Right 1991    [facility], [address] ARTHROSCOPY Right 2009    [facility]    NOSE SURGERY  1996    nasal polyp [address] OTHER SURGICAL HISTORY  5/23/2013    L3-5 Decompression L4-5 Posterior Spinal Fusion (Dr. Monica Barragan, [facility])   [facility]      scope problem. Neurological: Positive for weakness. All other systems reviewed and are negative. Objective:   Physical Exam   Constitutional: She appears well-developed and well-nourished. HENT:   Head: Normocephalic and atraumatic. Right Ear: Tympanic membrane normal.   Left Ear: Tympanic membrane normal.   Mouth/Throat: Oropharynx is clear and moist and mucous membranes are normal.   Neck: Carotid bruit is not present. Cardiovascular: Normal rate, regular rhythm and normal heart sounds. No murmur heard. Pulmonary/Chest: Effort normal and breath sounds normal.   Abdominal: Soft. Bowel sounds are normal.   Musculoskeletal: She exhibits no edema. Neurological: She is alert. Skin: Skin is warm and dry. Psychiatric: She has a normal mood and affect. Her behavior is normal. Cognition and memory are impaired. She exhibits abnormal recent memory and abnormal remote memory. Nursing note and vitals reviewed. Assessment:       Diagnosis Orders   1. General weakness     2. Unstable gait     3. Primary insomnia  ALPRAZolam (XANAX) 0.25 MG tablet   4. Dementia without behavioral disturbance, unspecified dementia type     5. Chronic kidney disease, unspecified CKD stage     6. Hypothyroidism, unspecified type     7. Iron deficiency anemia, unspecified iron deficiency anemia type     8. Moderate malnutrition ([address]     9.  Unintended weight loss             Plan:      -  Chronic medical problems stable  -  Continue current medications  -  Follow up with specialists as scheduled  -  Monitor weight closely  -  Refill Xanax prn sleep, risks discussed  -  RTO 3 mos, sooner prn        Dann Harada, DO

## 2019-04-26 NOTE — PROGRESS NOTES
Visit Information    Have you changed or started any medications since your last visit including any over-the-counter medicines, vitamins, or herbal medicines? Yes-see med list   Are you having any side effects from any of your medications? -  no  Have you stopped taking any of your medications? Is so, why? -  no    Have you seen any other physician or provider since your last visit? No  Have you had any other diagnostic tests since your last visit? No  Have you been seen in the emergency room and/or had an admission to a hospital since we last saw you? No  Have you had your routine dental cleaning in the past 6 months? no    Have you activated your [employer] account? If not, what are your barriers?  No: declined     Patient Care Team:  Matthew Haley DO as PCP - General (Family Medicine)  Matthew Haley DO as PCP - S Attributed Provider  Hannah Sue MD as Physician (Cardiology)  Ro Goddard MD as Physician (Nephrology)  Aren Ray RN as Care Coordinator  Vasiliy Abbott MD as Surgeon (General Surgery)    Medical History Review  Past Medical, Family, and Social History reviewed and does contribute to the patient presenting condition    Health Maintenance   Topic Date Due    DTaP/Tdap/Td vaccine (1 - Tdap) 06/27/1948    Shingles Vaccine (1 of 2) 06/27/1979    Flu vaccine (Season Ended) 09/01/2019    Pneumococcal 65+ years Vaccine (2 of 2 - PPSV23) 09/19/2019    TSH testing  03/12/2020    Potassium monitoring  03/24/2020    Creatinine monitoring  03/24/2020

## 2019-04-29 NOTE — TELEPHONE ENCOUNTER
[place] [facility] [place] a prescription for donepezil 5 mg one nightly and Xantac 150 mg one twice a day. [address] [facility]. Also, she's asking if patient should be taking osteobiflex (glucosamine) once or twice a day? DOLV 4/26/19, DONV 7/30/19.

## 2019-04-30 NOTE — CARE COORDINATION
exercises. Increase activity as tolerated. Call with any new questions or concerns. Diabetes Assessment    Medic Alert ID:  No  Meal Planning:  Avoidance of concentrated sweets   How often do you test your blood sugar?:  No Testing   Do you have barriers with adherence to non-pharmacologic self-management interventions? (Nutrition/Exercise/Self-Monitoring):  No   Have you ever had to go to the ED for symptoms of low blood sugar?:  No       No patient-reported symptoms              Ambulatory Care Coordination Assessment    Care Coordination Protocol  Program Enrollment:  Rising Risk  Referral from Primary Care Provider:  No  Week 1 - Initial Assessment     Do you have all of your prescriptions and are they filled?:  Yes  Barriers to medication adherence:  None  Are you able to afford your medications?:  Yes  How often do you have trouble taking your medications the way you have been told to take them?:  I always take them as prescribed. Do you have Home O2 Therapy?:  Yes   Oxygen Regimen: At night/Sleep Flow - Enter rate/FIO2:  2   Method of Delivery:  Nasal Cannula   CPAP Use:  None      Ability to seek help/take action for Emergent Urgent situations i.e. fire, crime, inclement weather or health crisis. :  Independent  Ability to ambulate to restroom:  Independent  Ability handle personal hygeine needs (bathing/dressing/grooming):  Needs Assistance  Ability to manage Medications:  Needs Assistance  Ability to prepare Food Preparation:  Needs Assistance  Ability to maintain home (clean home, laundry):  Needs Assistance  Ability to drive and/or has transportation:  Needs Assistance  Ability to do shopping:  Needs Assistance  Ability to manage finances:  Needs Assistance  Is patient able to live independently?:  No     Current Housing:  Private Residence        Per the Fall Risk Screening, did the patient have 2 or more falls or 1 fall with injury in the past year?:  Yes  How often do you think you are about to fall and you do NOT fall? For example, you grab something to stabilize yourself or hold onto a wall/furniture?:  Frequently  Use of a Mobility Aid:  Yes  Difficulty walking/impaired gait:  No  Issues with feet or shoes like numbness, edema, shoes not fitting:  No  Changes in vision, poor vision or poor lighting in environment:  No  Dizziness:  No  Other Fall Risk:  No     Frequent urination at night?:  No  Do you use rails/bars?:  Yes  Do you have a non-slip tub mat?:  Yes     Are you experiencing loss of meaning?:  No  Are you experiencing loss of hope and peace?:  No     Thinking about your patient's physical health needs, are there any symptoms or problems (risk indicators) you are unsure about that require further investigation?:  No identified areas of uncertainly or problems already being investigated   Are the patients physical health problems impacting on their mental well-being?:  No identified areas of concern   Are there any problems with your patients lifestyle behaviors (alcohol, drugs, diet, exercise) that are impacting on physical or mental well-being?:  No identified areas of concern   Do you have any other concerns about your patients mental well-being?  How would you rate their severity and impact on the patient?:  No identified areas of concern   How would you rate their home environment in terms of safety and stability (including domestic violence, insecure housing, neighbor harassment)?:  Consistently safe, supportive, stable, no identified problems   How do daily activities impact on the patient's well-being? (include current or anticipated unemployment, work, caregiving, access to transportation or other):  No identified problems or perceived positive benefits   How would you rate their social network (family, work, friends)?:  Adequate participation with social networks   How would you rate their financial resources (including ability to afford all required medical care)?: Financially secure, resources adequate, no identified problems   How wells does the patient now understand their health and well-being (symptoms, signs or risk factors) and what they need to do to manage their health?:  Reasonable to good understanding and already engages in managing health or is willing to undertake better management   How well do you think your patient can engage in healthcare discussions? (Barriers include language, deafness, aphasia, alcohol or drug problems, learning difficulties, concentration):  Clear and open communication, no identified barriers   Do other services need to be involved to help this patient?:  Other care/services in place and adequate   Are current services involved with this patient well-coordinated? (Include coordination with other services you are now recommendation): All required care/services in place and well-coordinated   Suggested Interventions and [place]:  Completed (Comment: [facility])   Meals on Wheels:  Completed   Other Services or Interventions:  has  come in 4x wk.  family does outdoor work   [employer] or Pill Pack:  Completed   Occupational Therapy:  Completed   Physical Therapy:  Completed   Transportation Services:  Completed   Zone Management Tools:  Not Started         Set up/Review an Education Plan, Set up/Review Goals              Prior to Admission medications    Medication Sig Start Date End Date Taking? Authorizing Provider   donepezil (ARICEPT) 5 MG tablet Take 1 tablet by mouth nightly 4/29/19   Yanely Jennings DO   ranitidine (ZANTAC) 150 MG tablet TAKE 1 TABLET BY MOUTH TWICE DAILY 4/29/19   Yanely Jennings,    ALPRAZolam Georgette Diop) 0.25 MG tablet Take 0.25 mg by mouth nightly as needed for Sleep. Historical Provider, MD HERNANDEZZoignacio Diop) 0.25 MG tablet Take 1 tablet by mouth nightly as needed for Sleep for up to 90 days.  4/26/19 7/25/19  Yanely Jennings DO   amLODIPine [facility]) 10 MG tablet Take 1 tablet by mouth daily 3/26/19   Juan Helm MD   metoprolol tartrate (LOPRESSOR) 50 MG tablet Take 50 mg by mouth daily  3/18/19   Historical Provider, MD   levothyroxine (SYNTHROID) 88 MCG tablet Take 1 tablet by mouth daily  Patient taking differently: Take 100 mcg by mouth daily  3/18/19   Dann Harada, DO   Misc Natural Products (OSTEO BI-FLEX TRIPLE STRENGTH PO) Take by mouth    Historical Provider, MD   benazepril (LOTENSIN) 20 MG tablet TAKE 1 TABLET BY MOUTH DAILY 12/17/18   Dann Harada, DO   Potassium 99 MG TABS Take 1 tablet by mouth 2 times daily     Historical Provider, MD   Calcium Carbonate (CALCIUM 600 PO) Take 1 tablet by mouth daily     Historical Provider, MD   GLUCOSAMINE-CHONDROITIN PO Take 1 tablet by mouth 2 times daily    Historical Provider, MD   Ferrous Sulfate (IRON) 325 (65 Fe) MG TABS Take 2 tablets by mouth daily    Historical Provider, MD   Cholecalciferol (VITAMIN D3) 2000 units CAPS Take 1 capsule by mouth daily    Historical Provider, MD   Docusate Calcium (STOOL SOFTENER PO) Take 2 tablets by mouth nightly    Historical Provider, MD   Multiple Vitamin (DAILY VITAMIN FORMULA PO) Take 1 tablet by mouth daily.     Historical Provider, MD       Future Appointments   Date Time Provider Blas Billings   7/30/2019  9:30 AM Dann Harada, DO [facility]

## 2019-05-07 NOTE — TELEPHONE ENCOUNTER
Seven Mckeon, from [facility] called stating that patient's  went to pharmacy to  patient's Rx fo Xanax on 4/27/19. From the pharmacy to home, Rx was misplaced and they are unable to find it anywhere. She has been using the rest of her Rx from when she was in the nursing home, but only has one day left of it. Is there anyway office cn contact pharmacy to get another refill for her Xanax 0.25 mg? Please advise.       Pharmacy: [facility]

## 2019-05-07 NOTE — TELEPHONE ENCOUNTER
I phoned [address] [phone] and Ray Winn was notified ok for early refill.

## 2019-05-17 NOTE — CARE COORDINATION
Ambulatory Care Coordination Note  5/17/2019  CM Risk Score: 5  Ryan Mortality Risk Score: 30    ACC: Nargis Nicholson RN    Summary Note: Ness Allen was referred to Care Coordination by [facility]. Had recent hospitalization in March for GB issues. Was d/c home and then admitted to SNF for rehab d/t weakness. Spoke with pt today. She is doing well since d/c from SNF. Has great family support. Reports that appetite has improved. Recently started receiving home delivered meals. Has decided to go with [facility]. Received first meal delivery from them today. Using walker when out to help reduce risk of falling. Has been d/c from [facility] PT.   assists getting her in to shower. Has shower chair/bench for safety. Denies any recent falls. Reviewed safety precautions. [facility] nursing continues to see pt. Setting up meds for pt at this time. Continues to wear oxygen at night. Denies any increased SOB cough or congestion. DM: pt reports improved appetite. Does not monitor BS's at home. Plan of care:  Continue safety precautions to help reduce risk of falls. Continue working with [facility]  Continue use of oxygen at night. Encouraged to utilize availability of same day appts at PCP office when needed. Encouraged to call with any new questions or concerns. Will continue to evaluate need for additional services in the home. Diabetes Assessment    Medic Alert ID:  No  Meal Planning:  Avoidance of concentrated sweets   How often do you test your blood sugar?:  No Testing   Do you have barriers with adherence to non-pharmacologic self-management interventions?  (Nutrition/Exercise/Self-Monitoring):  No   Have you ever had to go to the ED for symptoms of low blood sugar?:  No       No patient-reported symptoms       and   General Assessment    Do you have any symptoms that are causing concern?:  No                 Care Coordination Interventions    Program Enrollment:  Rising Risk  Referral from Primary Care Provider:  No  Suggested Interventions and [employer] [facility]:  Completed (Comment: [facility])  Meals on Wheels:  Completed (Comment: [employer] [facility] Delivered Meals)  Medi Set or Pill Pack:  Completed (Comment: grand daughter is nurse and manages medications. she fills pill box weekly)  Occupational Therapy:  Completed (Comment: [facility]. d/c from therapy)  Physical Therapy:  Completed (Comment: [facility]. d/c from therapy)  Transportation Support:  Completed  Zone Management Tools:  Not Started  Other Services or Interventions:  has  come in 4x wk.  family does outdoor work         Goals Addressed                 This [address]         I will reduce my risk of falls by the following: Use walking aids like cane or walker    Barriers: forgetful  Plan for overcoming my barriers: support and ongoing reminders from spouse, [place] , family members. Confidence: 8/10  Anticipated Goal Completion Date: 6/17/19            Prior to Admission medications    Medication Sig Start Date End Date Taking? Authorizing Provider   donepezil (ARICEPT) 5 MG tablet Take 1 tablet by mouth nightly 4/29/19   Swapnil Arias DO   ranitidine (ZANTAC) 150 MG tablet TAKE 1 TABLET BY MOUTH TWICE DAILY 4/29/19   Swapnil Arias DO   ALPRAZolam Ahmed Cordon) 0.25 MG tablet Take 0.25 mg by mouth nightly as needed for Sleep. Historical Provider, MD   ALPRAZolam Ahmed Cordon) 0.25 MG tablet Take 1 tablet by mouth nightly as needed for Sleep for up to 90 days.  4/26/19 7/25/19  Swapnil Arias DO   amLODIPine (NORVASC) 10 MG tablet Take 1 tablet by mouth daily 3/26/19   Juan Helm MD   metoprolol tartrate (LOPRESSOR) 50 MG tablet Take 50 mg by mouth daily  3/18/19   Historical Provider, MD   levothyroxine (SYNTHROID) 88 MCG tablet Take 1 tablet by mouth daily  Patient taking differently: Take 100 mcg by mouth daily  3/18/19   Swapnil Arias DO Misc Natural Products (OSTEO BI-FLEX TRIPLE STRENGTH PO) Take by mouth    Historical Provider, MD   benazepril (LOTENSIN) 20 MG tablet TAKE 1 TABLET BY MOUTH DAILY 12/17/18   Dorothea Ibanez DO   Potassium 99 MG TABS Take 1 tablet by mouth 2 times daily     Historical Provider, MD   Calcium Carbonate (CALCIUM 600 PO) Take 1 tablet by mouth daily     Historical Provider, MD   GLUCOSAMINE-CHONDROITIN PO Take 1 tablet by mouth 2 times daily    Historical Provider, MD   Ferrous Sulfate (IRON) 325 (65 Fe) MG TABS Take 2 tablets by mouth daily    Historical Provider, MD   Cholecalciferol (VITAMIN D3) 2000 units CAPS Take 1 capsule by mouth daily    Historical Provider, MD   Docusate Calcium (STOOL SOFTENER PO) Take 2 tablets by mouth nightly    Historical Provider, MD   Multiple Vitamin (DAILY VITAMIN FORMULA PO) Take 1 tablet by mouth daily.     Historical Provider, MD       Future Appointments   Date Time Provider Blas Billings   7/30/2019  9:30 AM Dorothea Ibanez, DO [address]

## 2019-06-04 NOTE — CARE COORDINATION
Attempted to reach Jessica Marks today for f/u. No answer. Unable to leave message d/t mailbox full.

## 2019-06-18 NOTE — CARE COORDINATION
Ambulatory Care Coordination Note  6/18/2019  CM Risk Score: 5  Ryan Mortality Risk Score: 30    ACC: Bin Villafuerte, RN    Summary Note: Pee Luis is being followed by Care Coordination for education and assistance in managing her chronic conditions. DM: reports diet is doing well. Receiving home delivered meals through [employer]. Does not monitor BS's at home as not instructed to. Continues to wear oxygen at HS. Pt states that she is getting tired of it and wondering if she is doing well enough for it to be stopped. Plans on discussing this with PCP at upcoming appt in July. Notes placed in appt desk re: this. Continues to ambulate with walker/cane. Denies any recent falls. Family very supportive.  assists when needed.  assists with hygiene needs for safety. Plan of Care:  Continue using AD for help with ambulation. Reviewed safety precautions. F/u with PCP on 7/30-discuss oxygen at that time. Continue working with [facility] re: med mgmt. Call with any new questions or concerns. Diabetes Assessment    Medic Alert ID:  No  Meal Planning:  Avoidance of concentrated sweets   How often do you test your blood sugar?:  No Testing   Do you have barriers with adherence to non-pharmacologic self-management interventions?  (Nutrition/Exercise/Self-Monitoring):  No   Have you ever had to go to the ED for symptoms of low blood sugar?:  No       Do you have hyperglycemia symptoms?:  No   Do you have hypoglycemia symptoms?:  No   Blood Sugar Monitoring Regimen:  Not Testing   Blood Sugar Trends:  No Change       and   General Assessment    Do you have any symptoms that are causing concern?:  No             Care Coordination Interventions    Program Enrollment:  Rising Risk  Referral from Primary Care Provider:  No  Suggested Interventions and [address] [place] [address] Hwy:  Completed (Comment: [facility])  Meals on Wheels:  Completed (Comment: [employer] home delivered meals)  Medi Set or Pill Pack:  Completed (Comment: grand daughter is nurse and manages medications. she fills pill box weekly)  Occupational Therapy:  Completed (Comment: [facility]. d/c from therapy)  Physical Therapy:  Completed (Comment: [facility]. d/c from therapy)  Transportation Support:  Completed  Zone Management Tools:  Completed  Other Services or Interventions:  has  come in 4x wk.  family does outdoor work         Goals Addressed                 This Visit's Progress     Medication Management        I will take my medication as directed. I will notify my provider of any problems with medications, like adverse effects or side effects. I will notify my provider/Care Coordinator if I am unable to afford my medications. I will notify my provider for advice before I stop taking any of my medication. I will continue working with [facility] as they are managing medications at this time. Barriers: poor memory recall at times  Plan for overcoming my barriers: work with [facility] for med mgmt. Once [facility] d/c have another family member assist with medication set up. Confidence: 8/10  Anticipated Goal Completion Date: 7/18/19       COMPLETED: Reduce Falls         I will reduce my risk of falls by the following: Use walking aids like cane or walker    Barriers: forgetful  Plan for overcoming my barriers: support and ongoing reminders from spouse, [facility] , family members. Confidence: 8/10  Anticipated Goal Completion Date: 6/17/19            Prior to Admission medications    Medication Sig Start Date End Date Taking? Authorizing Provider   donepezil (ARICEPT) 5 MG tablet Take 1 tablet by mouth nightly 4/29/19   Jordana Aquino DO   ranitidine (ZANTAC) 150 MG tablet TAKE 1 TABLET BY MOUTH TWICE DAILY 4/29/19   Jordana Aquino DO   ALPRAZolam Blanca [place]) 0.25 MG tablet Take 0.25 mg by mouth nightly as needed for Sleep.     Historical Provider, MD   ALPVERONICAZoignacio Hansenbeth [place]) 0.25 MG tablet Take 1 tablet by mouth

## 2019-07-18 NOTE — CARE COORDINATION
Ambulatory Care Coordination Note  7/18/2019  CM Risk Score: 5  Charlson 10 Year Mortality Risk Score: 100%     ACC: Clemencia Cogan    Summary Note: I spoke with the patient for continued Care Coordination follow up and education. Patient states she is doing very well. She denies chest pain, SOB or tightness. Instructed patient on importance of early symptom recognition to prevent hospitalization and ED visits. Patient is not on oxygen any more. She states she is doing fine without it. Patient is still active with [employer] and continues receiving meals. Patient states she can not gain weight. She is drinking a supplement drink every day. Patient states she had issues with constipation, but it has resolved. Patient is no longer active with [facility] services. Confirmed upcoming appointment with PCP. No falls. Educated on how to identify sx's that are worse than the baseline and the importance of early symptom recognition and reporting to prevent exacerbation which may lead to ED visits and hospital admissions. I advised patient to contact PCP office if needed. No further needs at this time. Care Coordination Interventions    Program Enrollment:  Rising Risk  Referral from Primary Care Provider:  No  Suggested Interventions and Community Resources  Home Health Services:  Completed (Comment: [facility])  Meals on Wheels:  Completed (Comment: [facility] home delivered meals)  Medi Set or Pill Pack:  Completed (Comment: grand daughter is nurse and manages medications. she fills pill box weekly)  Occupational Therapy:  Completed (Comment: [facility]. d/c from therapy)  Physical Therapy:  Completed (Comment: [facility].  d/c from therapy)  Transportation Support:  Completed  Zone Management Tools:  Completed  Other Services or Interventions:  has  come in 4x wk.  family does outdoor work         Goals Addressed    None         Prior to Admission medications    Medication Sig Start Date End Date Taking? Authorizing Provider   ALPRAZolam Ellie Madison) 0.25 MG tablet TAKE 1 TABLET BY MOUTH EVERY NIGHT AS NEEDED FOR SLEEP 7/2/19 9/30/19  Saroj Hoyt DO   donepezil (ARICEPT) 5 MG tablet Take 1 tablet by mouth nightly 4/29/19   Saroj Hoyt DO   ranitidine (ZANTAC) 150 MG tablet TAKE 1 TABLET BY MOUTH TWICE DAILY 4/29/19   Saroj Hoyt DO   ALPRAZolam Ellie Madison) 0.25 MG tablet Take 0.25 mg by mouth nightly as needed for Sleep. Historical Provider, MD   amLODIPine (NORVASC) 10 MG tablet Take 1 tablet by mouth daily 3/26/19   [place]aren[place] [id number] [facility], MD   metoprolol tartrate (LOPRESSOR) 50 MG tablet Take 50 mg by mouth daily  3/18/19   Historical Provider, MD   levothyroxine (SYNTHROID) 88 MCG tablet Take 1 tablet by mouth daily  Patient taking differently: Take 100 mcg by mouth daily  3/18/19   Saroj Hoyt DO   Misc Natural Products (OSTEO BI-FLEX TRIPLE STRENGTH PO) Take by mouth    Historical Provider, MD   benazepril (LOTENSIN) 20 MG tablet TAKE 1 TABLET BY MOUTH DAILY 12/17/18   Saroj Hoyt DO   Potassium 99 MG TABS Take 1 tablet by mouth 2 times daily     Historical Provider, MD   Calcium Carbonate (CALCIUM 600 PO) Take 1 tablet by mouth daily     Historical Provider, MD   GLUCOSAMINE-CHONDROITIN PO Take 1 tablet by mouth 2 times daily    Historical Provider, MD   Ferrous Sulfate (IRON) 325 (65 Fe) MG TABS Take 2 tablets by mouth daily    Historical Provider, MD   Cholecalciferol (VITAMIN D3) 2000 units CAPS Take 1 capsule by mouth daily    Historical Provider, MD   Docusate Calcium (STOOL SOFTENER PO) Take 2 tablets by mouth nightly    Historical Provider, MD   Multiple Vitamin (DAILY VITAMIN FORMULA PO) Take 1 tablet by mouth daily.     Historical Provider, MD       Future Appointments   Date Time Provider Blas Billings   7/30/2019  9:30 AM Saroj Hoyt DO [address] [facility]

## 2019-07-30 NOTE — PROGRESS NOTES
normal.   Neck: Carotid bruit is not present. Cardiovascular: Normal rate, regular rhythm and normal heart sounds. No murmur heard. Pulmonary/Chest: Effort normal and breath sounds normal.   Abdominal: Soft. Bowel sounds are normal.   Musculoskeletal: She exhibits no edema. Neurological: She is alert and oriented to person, place, and time. Skin: Skin is warm and dry. Psychiatric: She has a normal mood and affect. Her behavior is normal.   Nursing note and vitals reviewed. Assessment:       Diagnosis Orders   1. Unintended weight loss     2. Iron deficiency anemia, unspecified iron deficiency anemia type  CBC Auto Differential    Ferritin   3. Hypothyroidism, unspecified type  TSH with Reflex   4. Dementia without behavioral disturbance, unspecified dementia type  donepezil (ARICEPT) 10 MG tablet   5. General weakness     6. Unstable gait     7. Chronic kidney disease, unspecified CKD stage  Basic Metabolic Panel   8. Primary insomnia             Plan:      -  Chronic medical problems stable  -  Continue current medications  -  Follow up with specialists as scheduled  -  Check labs above, will call  -  Concerned about her weight loss, down 9 lbs in the last 3 mos  -  Per pt, appetite fine. She is fighting constipation for which Miralax prn was recommended  -  Daughter will monitor on regular basis, GI eval if continues  -  Otherwise, RTO 6 mos    Amrit He received counseling on the following healthy behaviors: nutrition    Patient given educational materials on Exercise    I have instructed Amrit He to complete a self tracking handout on Weights and instructed them to bring it with them to her next appointment. Discussed use, benefit, and side effects of prescribed medications. Barriers to medication compliance addressed. All patient questions answered. Pt voiced understanding.            Sean Jones, DO

## 2019-08-27 NOTE — CARE COORDINATION
Ambulatory Care Coordination Note  8/27/2019  CM Risk Score: 5  Charlson 10 Year Mortality Risk Score: 100%      ACC: Jolly Ron, MIKAYLA    Summary Note: Briana Bhardwaj is being followed by care coordination for education and assistance in managing her care. Spoke with Briana Bhardwaj today for f/u. Pt recently seen for AWV. Obtained lab work ordered at that visit. Has been battling with constipation, wt loss, and anemia. Referred to GI. Pt reports that she had an appt yesterday. Was having some constipation so mag citrate was ordered. Pt took dose last evening and had success. Reports feeling good today. Denies decreased appetite. Reports that she just doesn't eat very large portions. Drinking Boost in place of a meal.  Encouraged pt to not use supplement drinks as meal replacement, but to instead use as supplement. Suggested she drink in between each meal.  Pt reports that she will try this. Ensure coupons mailed to pt. Reports having some right sided neck pain for the last 3-4 wks. Pain is improving, but still present. Only present when bending over. Unsure if she strained something or not, but reports that it does feel like a muscle strain. I suggested pt try some OTC muscle rub to see if that helps. Advised if no relief and cream doesn't help to call and arrange appt for evaluation. Plan of Care:  Pt is doing well. Has great family support. Constipation resolved. Will f/u with GI in 4 wks. Mailed [employer] coupons for assistance with cost of supplemental drinks. Plan of care:  Continue f/u with GI  Monitor for s/s of constipation  Drink supplement drinks in between meals vs using as meal replacement. Use cane for ambulation. Ensure coupons mailed to pt to use. Pt is doing well overall. No new barriers. No recent ED visits or hospitalizations. Last admission was in March for GB disease.   Will graduate from care coordination upon PCP approval.           Care Coordination Interventions Program Enrollment:  Rising Risk  Referral from Primary Care Provider:  No  Suggested Interventions and Community Resources  Home Health Services:  Completed (Comment: [facility])  Meals on Wheels:  Completed (Comment: [facility] home delivered meals)  Medi Set or Pill Pack:  Completed (Comment: grand daughter is nurse and manages medications. she fills pill box weekly)  Occupational Therapy:  Completed (Comment: [facility]. d/c from therapy)  Physical Therapy:  Completed (Comment: [facility]. d/c from therapy)  Transportation Support:  Completed  Zone Management Tools:  Completed  Other Services or Interventions:  has  come in 4x wk.  family does outdoor work         Goals Addressed                 This Visit's Progress     COMPLETED: Medication Management        I will take my medication as directed. I will notify my provider of any problems with medications, like adverse effects or side effects. I will notify my provider/Care Coordinator if I am unable to afford my medications. I will notify my provider for advice before I stop taking any of my medication. I will continue working with [facility] as they are managing medications at this time. Barriers: poor memory recall at times  Plan for overcoming my barriers: work with [facility] for med mgmt. Once Corbin Kaur d/c have another family member assist with medication set up. Confidence: 8/10  Anticipated Goal Completion Date: 7/18/19            Prior to Admission medications    Medication Sig Start Date End Date Taking?  Authorizing Provider   amLODIPine (NORVASC) 10 MG tablet TAKE 1 TABLET BY MOUTH DAILY 8/8/19   Taylor Lopez, DO   ALPRAZolam Iva Scales) 0.25 MG tablet TAKE 1 TABLET BY MOUTH EVERY NIGHT AS NEEDED FOR SLEEP 7/31/19 10/29/19  Taylor Lopez, DO   donepezil (ARICEPT) 10 MG tablet Take 1 tablet by mouth nightly 7/30/19   Taylor Lopez DO   ranitidine (ZANTAC) 150 MG tablet TAKE 1 TABLET BY MOUTH TWICE DAILY 4/29/19   Brisa Steel

## 2019-08-27 NOTE — LETTER
8/27/2019    [address] [place] [address] [address]      [employer]     Congratulations on the progress you have made improving and taking charge of your health! Your recent follow up with Dori Rutherford, DO finds you doing well and are no longer in need of Care Coordination services. I know you will continue to use the knowledge and tools you have gained to continue down a healthy path. As you have demonstrated that you are able to successfully manage your health and wellness, I will no longer contact you on a regular basis. Again, congratulations and please know if there are any changes or you have a need for my services in the future, you may always contact me for questions or concerns.   In good health,       Catherine Mcelroy RN

## 2019-10-23 PROBLEM — N18.30 CHRONIC KIDNEY DISEASE (CKD), STAGE III (MODERATE) (HCC): Status: ACTIVE | Noted: 2018-02-20

## 2019-10-23 PROBLEM — E83.52 HYPERCALCEMIA: Status: ACTIVE | Noted: 2019-01-01

## 2019-10-23 PROBLEM — E78.5 HYPERLIPIDEMIA: Status: ACTIVE | Noted: 2018-02-20

## 2019-10-28 LAB — PROTEIN ELECTROPHORESIS, SERUM: NORMAL

## 2024-06-06 NOTE — PLAN OF CARE
Chief Complaint     Chief Complaint   Patient presents with    Shortness of Breath     History     Cheri Palacio is a 81 year old female who presents to the emergency department for evaluation of SOB.     Primary Care Provider: Rayna Knight PA-C    History provided by the patient.    The patient has a history CVA, chronic diastolic CHF, HTN, atrial fibrillation on Xarelto (no missed doses), and pulmonary hypertension. She reports SOB onset this morning when she took the second half of her pain medication. She took the first half at 7:30 AM then at 10:20 AM she took the second half, patient has a compression fracture of T4 and takes the first half of her pain medication and only takes the second half if her pain does not improve. No fever, chills, cough, rhinorrhea, sore throat, chest pain, nausea, vomiting, abdominal pain, urinary symptoms, or diarrhea. She notes some constipation, she takes Miralax. The patient denies history of asthma or COPD.     Patient was recently admitted from 6/1-6/3, she state she was admitted for the same thing. She states she has been getting around at home fine and states she does not use a cane or a walker.     ALLERGIES:   Allergen Reactions    Sotalol WEAKNESS       Current Facility-Administered Medications   Medication    lactated ringers infusion     Current Outpatient Medications   Medication Sig    potassium chloride (KLOR-CON SPRINKLE) 10 MEQ ER capsule TAKE ONE TABLET BY MOUTH DAILY FOR 3 DAYS STARTING 5-15-24 THEN AS NEEDED    furosemide (LASIX) 20 MG tablet TAKE 1 TABLET DAILY X 3 DAYS STARTING 5/15/24 THEN ONLY AS DIRECTED BY CARDIOLOGY    dilTIAZem (CARDIZEM SR) 60 MG 12 hr capsule TAKE 1 CAPSULE (60 MG TOTAL) BY MOUTH DAILY    HYDROcodone-acetaminophen (NORCO) 5-325 MG per tablet Take 1 tablet by mouth every 6 hours as needed for Pain.    losartan (COZAAR) 50 MG tablet Take 1 tablet by mouth daily.    MECLIZINE HCL PO Take 1 tablet by mouth as needed. 0.5-1 tablet  Problem: Falls - Risk of:  Goal: Will remain free from falls  Description  Will remain free from falls  Outcome: Ongoing  Note:   Patient free from falls this shift. Gait steady with 1 assist and walker. Alert and oriented x 4, using call light appropriately. Goal: Absence of physical injury  Description  Absence of physical injury  Outcome: Ongoing     Problem: Pain Control  Goal: Maintain pain level at or below patient's acceptable level (or 5 if patient is unable to determine acceptable level)  Outcome: Ongoing  Flowsheets (Taken 3/21/2019 0119)  Patient's Stated Pain Goal: No pain  Note:   Patient denies pain at this time. Meeting pain goal of 0/10 with no interventions. Problem: Cardiovascular  Goal: No DVT, peripheral vascular complications  Outcome: Ongoing  Note:   Patient free from signs of dvt this shift. No pain, warmth or redness to calves. Scd's in use. Problem: GI  Goal: No bowel complications  Outcome: Ongoing  Note:   Abdomen soft/tender, bowel sounds active. Patient is passing gas, no bowel movement this shift. Denies nausea. Goal: Bowel movement at least every other day  Outcome: Ongoing     Problem: Skin Integrity/Risk  Goal: No skin breakdown during hospitalization  Outcome: Ongoing  Note:   Redness to coccyx intact/blanches-allevyn applied. Abrasion to right elbow intact. Bilateral heels red/alden-skin prep applied. Goal: Wound healing  Outcome: Ongoing     Problem: DISCHARGE BARRIERS  Goal: Patient's continuum of care needs are met  Outcome: Ongoing  Note:   Patient anticipates being discharged to home with , denies needs at this time. Care plan reviewed with patient. Patient verbalized understanding of the plan of care and contribute to goal setting. as needed for dizziness    DISPENSE Calcium vitamin D tablet   Take 1 tablet by mouth at night    Cholecalciferol (Vitamin D3) 50 mcg (2,000 units) capsule Take 50 mcg by mouth daily.    acetaminophen (TYLENOL) 500 MG tablet Take 500 mg by mouth every 6 hours as needed for Pain.    polyethylene glycol (MiraLax) 17 GM/SCOOP powder Take 17 g by mouth daily as needed (constipation). Stir and dissolve powder in any 4 to 8 ounces of beverage, then drink.    atorvastatin (LIPITOR) 10 MG tablet Take 1 tablet by mouth daily.    rivaroxaban (Xarelto) 15 MG Tab Take 1 tablet by mouth daily (with dinner).    alendronate (FOSAMAX) 70 MG tablet Take 1 tablet by mouth every 7 days.    psyllium (Metamucil) 28.3 % powder for oral suspension Mix with liquid as directed and drink by mouth daily    Ascorbic Acid (vitamin C) 1000 MG tablet Take 1,000 mg by mouth daily.    B Complex-C-Folic Acid Tab Take 1 tablet by mouth daily.    Multiple Vitamins-Minerals (PRESERVISION AREDS 2) Cap Take 1 capsule by mouth 2 times daily.         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 England     Colposcopy N/A 2021    D and c      Egd  2023    Gi endoscopic ultrasound  2023    Glaucoma surg,trabecu ab externo Right 2023    Dr. England express shunt with IOL    Hysterectomy  2023    Lap assisted Robotic hysterectomy - stenotic cervix, thickened endometruim    Hysterectomy  2023    robotic hysterectomy with BSO    Leep  2021    Nail removal Right 2023    nail on great toe removed    Rectal biopsy  2020    Thyroidectomy Right 2020    Tonsillectomy  195       Family History   Problem Relation Age of Onset    Schizophrenia Mother     Macular degeneration Father     Stroke Father     Glaucoma Sister     Hypertension Sister     Osteoarthritis Sister     Atrial Fibrilliation Sister     Heart disease Sister         Irregular heart beat    Glaucoma Sister     Hypertension Sister     Other Sister          as infant of spinal meningitis    Cataracts Brother     Cancer Brother         Prostate    Heart disease Brother     Stroke Brother         X2    Cancer Brother         Skin    Heart disease Brother     Hypertension Brother     Atrial Fibrilliation Brother     Atrial Fibrilliation Brother     Other Brother          as child from pneumonia    Thyroid Daughter         thyroid distroyed itself per pt     Hypertension Daughter     Glaucoma Maternal Aunt     Schizophrenia Son        Social History     Tobacco Use    Smoking status: Never     Passive exposure: Past    Smokeless tobacco: Never   Vaping Use    Vaping status: never used   Substance Use Topics    Alcohol use: No    Drug use: No        Review of Systems     ALL 13 SYSTEMS REVIEWED AND NEGATIVE OR NONCONTRIBUTORY UNLESS OTHERWISE NOTED IN HPI    Physical Exam     ED Triage Vitals [24 5124]   ED Triage Vitals Group      Temp 97.5 °F (36.4 °C)      Heart Rate 64      Resp (!) 24      BP (!) 190/91      SpO2 100 %      EtCO2 mmHg       Height       Weight       Weight Scale Used       BMI  (Calculated)       IBW/kg (Calculated)       Vitals:    06/05/24 1824 06/05/24 1953   BP: (!) 190/91 (!) 175/80   Patient Position: Sitting/High-Giraldo's    Pulse: 64 (!) 50   Resp: (!) 24 (!) 25   Temp: 97.5 °F (36.4 °C)    TempSrc: Tympanic    SpO2: 100% 97%       I have reviewed the vital signs and nursing notes.  General: Awake, alert, no acute distress.  Nontoxic appearing.  Head:  Atraumatic, normocephalic.    ENT:  Oral mucosa is pink. No tracheal deviation.  Neck: Neck is supple with full range of motion.    Cardiovascular:  RRR. No murmurs, rubs or gallops. Peripheral pulses palpable and equal bilaterally.  Respiratory: Symmetric chest wall rise. No rhonchi, rales, or wheezes. No use of accessory muscles.    Musculoskeletal:  No cyanosis or edema. Moving through full ROM without difficulty.  Abdomen:  Soft, nontender, nondistended. No rebound/guarding/rigidity.   Neuro:  GCS 15, EOMI, PERRLA, moving all four extremities, interacting appropriately. Speech clear. 5/5 strength x 4. Sensation intact.  CNs 2-12 grossly intact  Psych:  Calm, appropriate.    Skin:  Warm, dry.      ED Course      Medication:  Medications   lactated ringers infusion ( Intravenous New Bag 6/5/24 2100)     ED Course:   I reviewed the patient's old charting, obtained a history, performed a physical examination, and discussed the plan of care with the patient.    ED Course as of 06/05/24 2110 Wed Jun 05, 2024 2100 Spoke with Dr. Davis who has agreeable with the plan of admission. [CS]      ED Course User Index  [CS] Odalis Zayas MD     Recheck on patient. Discussed with patient ED findings and plan for admission. Any questions have been answered.    Treatments Included: history and physical exam, discussion of treatment plan, labs, imaging, EKG      EKG Results     EKG Interpretation  Time: 1918   Rate: 49 beats per minute  Rhythm:  Sinus bradycardia with sinus arrhythmia with first-degree AV block   Morphology: There are  no ST segment elevation or depression changes. NE interval of 256 milliseconds, QRS duration of 78 milliseconds, QTc interval of 395 milliseconds.    Change from previous:  No significant change from EKG dated 5/31/2024    EKG tracing personally interpreted by ED physician    Lab Results     Pertinent Lab Findings and Radiology Report:  Labs Reviewed   COMPREHENSIVE METABOLIC PANEL - Abnormal; Notable for the following components:       Result Value    Sodium 126 (*)     Chloride 93 (*)     BUN 28 (*)     Creatinine 0.97 (*)     Glomerular Filtration Rate 59 (*)     BUN/Cr 29 (*)     GOT/AST 47 (*)     All other components within normal limits   CBC WITH AUTOMATED DIFFERENTIAL (PERFORMABLE ONLY) - Abnormal; Notable for the following components:    RBC 3.89 (*)     HGB 11.8 (*)     HCT 34.5 (*)     Absolute Lymphocytes 0.8 (*)     Absolute Monocytes 1.0 (*)     All other components within normal limits    Narrative:     This is an appended report.  These results have been appended to a previously verified report.   NT PROBNP - Abnormal; Notable for the following components:    NT-proBNP 3,671 (*)     All other components within normal limits   URINALYSIS & REFLEX MICROSCOPY WITH CULTURE IF INDICATED - Abnormal; Notable for the following components:    SPECIFIC GRAVITY, URINALYSIS <1.005 (*)     All other components within normal limits   TROPONIN I, HIGH SENSITIVITY - Normal   MAGNESIUM - Normal   CBC WITH DIFFERENTIAL    Narrative:     The following orders were created for panel order CBC with Automated Differential.                  Procedure                               Abnormality         Status                                     ---------                               -----------         ------                                     CBC with Automated Dif...[[id number]]  Abnormal            Final result                                                 Please view results for these tests on the individual orders.    RAINBOW DRAW    Narrative:     The following orders were created for panel order [place] Draw Light Blue Top Collected? 1 Label; Red Top Collected? No Labels; Light Green Top Collected? No Labels; Lavender Top Collected? No Labels; Gold Top Collected? No Labels; Pink Top Collected? No Labels; Grey Top Collected? 1 Label.                  Procedure                               Abnormality         Status                                     ---------                               -----------         ------                                     Light Blue Top[[id number]]                                 In process                                 Grey Top Tube[[id number]]                                  In process                                                   Please view results for these tests on the individual orders.   LIGHT BLUE TOP   GREY TOP TUBE     XR CHEST PA AND LATERAL 2 VIEWS   Final Result by Abel Luevano MD (06/05 1957)   IMPRESSION:   No acute cardiopulmonary disease is seen.                              Electronically Signed by: Abel Luevano MD   Signed on: 6/5/2024 7:57 PM   Created on Workstation ID: [id number]   Signed on Workstation ID: [id number]          Any images above were personally reviewed by me, via direct independent visualization of the image(s) and/or with the corresponding radiologist. Per my interpretation imaging shows:  No acute cardiopulmonary process     MDM, Assessment, and Plan     Medical Decision Making:  Cheri Palacio is a 81 year old female who presents with a chief complaint of shortness a breath and weakness.  Vital signs with elevated blood pressure otherwise unremarkable.  Clinically the patient with no acute increased work of breathing.  No rales, rhonchi, or wheezes.  EKG nonischemic with a negative troponin.  Patient monitored on telemetry without any evidence of arrhythmia.  Labs without any leukocytosis.  Slight anemia that appears to be at the patient's  baseline.  She denies any new bleeding concerns.  Patient with hyponatremia and hypochloremia, suspect poor p.o. intake and will start gentle hydration.  No significant changes in kidney or electrolyte derangements.  UA without any evidence of infection.  Chest x-ray without any acute cardiopulmonary process.  Plan to admit for further workup and monitoring.    Family Communications: My findings above and plan below were discussed with the patient; I attempted to answer all questions.    Clinical Impression     Final Impression:  ED Diagnoses       Diagnosis Comment Associated Orders       Final diagnoses    Shortness of breath -- --    Hyponatremia -- --            Disposition      Admit  6/5/2024  9:01 PM  Telemetry Bed?: Yes  Admitting Physician: JOSE LUIS MONTES [[id number]]  Is this a telephone or verbal order?: This is a telephone order from the admitting physician    Odalis Zayas MD 6/5/2024 9:10 PM  Emergency Medicine     Closure:  The patient understands that this is a provisional diagnosis. Provisional diagnosis can and do change. The diagnosis that you are discharged with today is based on the symptoms with which you presented today. If any new symptoms occur or worsen, you should seek immediate attention for re-evaluation.  Any symptoms that persist or fail to completely resolve require further evaluation by your other healthcare provider(s).      This chart was documented by Natalie Pederson, acting as a scribe for Odalis Zayas MD. 6/5/2024, 7:34 PM.      The documentation recorded by the scribe accurately and completely reflects the service(s) I personally performed and the decisions made by me.        Odalis Zayas MD  06/06/24 [id number]

## 2024-07-08 NOTE — PROGRESS NOTES
Dr Rowena Camacho states she recommend tele and cardiology consult if ok with primary. Alice Qureshi cnp notified through [facility]. Alice Qureshi states that she does not feel it is necessary due to they do not plan on surgery.  Dr Rowena Camacho notified Signature needed:  Dr. Fernando    Form:  [facility] Patient Care    Fax #[phone]    Folder:  [facility]